# Patient Record
Sex: MALE | Race: WHITE | NOT HISPANIC OR LATINO | URBAN - METROPOLITAN AREA
[De-identification: names, ages, dates, MRNs, and addresses within clinical notes are randomized per-mention and may not be internally consistent; named-entity substitution may affect disease eponyms.]

---

## 2018-03-06 ENCOUNTER — INPATIENT (INPATIENT)
Facility: HOSPITAL | Age: 48
LOS: 1 days | Discharge: HOME | End: 2018-03-08
Attending: HOSPITALIST

## 2018-03-06 VITALS
TEMPERATURE: 97 F | RESPIRATION RATE: 18 BRPM | HEART RATE: 82 BPM | OXYGEN SATURATION: 97 % | DIASTOLIC BLOOD PRESSURE: 88 MMHG | SYSTOLIC BLOOD PRESSURE: 145 MMHG

## 2018-03-06 DIAGNOSIS — I20.0 UNSTABLE ANGINA: ICD-10-CM

## 2018-03-06 DIAGNOSIS — I25.810 ATHEROSCLEROSIS OF CORONARY ARTERY BYPASS GRAFT(S) WITHOUT ANGINA PECTORIS: ICD-10-CM

## 2018-03-06 DIAGNOSIS — Z98.890 OTHER SPECIFIED POSTPROCEDURAL STATES: Chronic | ICD-10-CM

## 2018-03-06 DIAGNOSIS — Z95.1 PRESENCE OF AORTOCORONARY BYPASS GRAFT: Chronic | ICD-10-CM

## 2018-03-06 LAB
ALBUMIN SERPL ELPH-MCNC: 4.6 G/DL — SIGNIFICANT CHANGE UP (ref 3–5.5)
ALP SERPL-CCNC: 47 U/L — SIGNIFICANT CHANGE UP (ref 30–115)
ALT FLD-CCNC: 34 U/L — SIGNIFICANT CHANGE UP (ref 0–41)
ANION GAP SERPL CALC-SCNC: 10 MMOL/L — SIGNIFICANT CHANGE UP (ref 7–14)
APTT BLD: 27.6 SEC — SIGNIFICANT CHANGE UP (ref 27–39.2)
AST SERPL-CCNC: 26 U/L — SIGNIFICANT CHANGE UP (ref 0–41)
BASOPHILS # BLD AUTO: 0.04 K/UL — SIGNIFICANT CHANGE UP (ref 0–0.2)
BASOPHILS NFR BLD AUTO: 0.6 % — SIGNIFICANT CHANGE UP (ref 0–1)
BILIRUB SERPL-MCNC: 0.8 MG/DL — SIGNIFICANT CHANGE UP (ref 0.2–1.2)
BUN SERPL-MCNC: 13 MG/DL — SIGNIFICANT CHANGE UP (ref 10–20)
CALCIUM SERPL-MCNC: 9.9 MG/DL — SIGNIFICANT CHANGE UP (ref 8.5–10.1)
CHLORIDE SERPL-SCNC: 104 MMOL/L — SIGNIFICANT CHANGE UP (ref 98–110)
CK MB BLD-MCNC: 2 % — SIGNIFICANT CHANGE UP (ref 0–4)
CK MB CFR SERPL CALC: 1.4 NG/ML — SIGNIFICANT CHANGE UP (ref 0.6–6.3)
CK MB CFR SERPL CALC: 3.2 NG/ML — SIGNIFICANT CHANGE UP (ref 0.6–6.3)
CK SERPL-CCNC: 150 U/L — SIGNIFICANT CHANGE UP (ref 0–225)
CK SERPL-CCNC: 168 U/L — SIGNIFICANT CHANGE UP (ref 0–225)
CO2 SERPL-SCNC: 24 MMOL/L — SIGNIFICANT CHANGE UP (ref 17–32)
CREAT SERPL-MCNC: 1 MG/DL — SIGNIFICANT CHANGE UP (ref 0.7–1.5)
EOSINOPHIL # BLD AUTO: 0.15 K/UL — SIGNIFICANT CHANGE UP (ref 0–0.7)
EOSINOPHIL NFR BLD AUTO: 2.2 % — SIGNIFICANT CHANGE UP (ref 0–8)
GLUCOSE SERPL-MCNC: 92 MG/DL — SIGNIFICANT CHANGE UP (ref 70–110)
HCT VFR BLD CALC: 41.8 % — LOW (ref 42–52)
HGB BLD-MCNC: 14.6 G/DL — SIGNIFICANT CHANGE UP (ref 14–18)
IMM GRANULOCYTES NFR BLD AUTO: 0.4 % — HIGH (ref 0.1–0.3)
LYMPHOCYTES # BLD AUTO: 1.8 K/UL — SIGNIFICANT CHANGE UP (ref 1.2–3.4)
LYMPHOCYTES # BLD AUTO: 26.9 % — SIGNIFICANT CHANGE UP (ref 20.5–51.1)
MCHC RBC-ENTMCNC: 30.8 PG — SIGNIFICANT CHANGE UP (ref 27–31)
MCHC RBC-ENTMCNC: 34.9 G/DL — SIGNIFICANT CHANGE UP (ref 32–37)
MCV RBC AUTO: 88.2 FL — SIGNIFICANT CHANGE UP (ref 80–94)
MONOCYTES # BLD AUTO: 0.67 K/UL — HIGH (ref 0.1–0.6)
MONOCYTES NFR BLD AUTO: 10 % — HIGH (ref 1.7–9.3)
NEUTROPHILS # BLD AUTO: 3.99 K/UL — SIGNIFICANT CHANGE UP (ref 1.4–6.5)
NEUTROPHILS NFR BLD AUTO: 59.9 % — SIGNIFICANT CHANGE UP (ref 42.2–75.2)
NRBC # BLD: 0 /100 WBCS — SIGNIFICANT CHANGE UP (ref 0–0)
PLATELET # BLD AUTO: 255 K/UL — SIGNIFICANT CHANGE UP (ref 130–400)
POTASSIUM SERPL-MCNC: 3.9 MMOL/L — SIGNIFICANT CHANGE UP (ref 3.5–5)
POTASSIUM SERPL-SCNC: 3.9 MMOL/L — SIGNIFICANT CHANGE UP (ref 3.5–5)
PROT SERPL-MCNC: 7 G/DL — SIGNIFICANT CHANGE UP (ref 6–8)
RBC # BLD: 4.74 M/UL — SIGNIFICANT CHANGE UP (ref 4.7–6.1)
RBC # FLD: 11.9 % — SIGNIFICANT CHANGE UP (ref 11.5–14.5)
SODIUM SERPL-SCNC: 138 MMOL/L — SIGNIFICANT CHANGE UP (ref 135–146)
TROPONIN I SERPL-MCNC: 0.07 NG/ML — HIGH (ref 0–0.05)
TROPONIN I SERPL-MCNC: 0.23 NG/ML — HIGH (ref 0–0.05)
WBC # BLD: 6.68 K/UL — SIGNIFICANT CHANGE UP (ref 4.8–10.8)
WBC # FLD AUTO: 6.68 K/UL — SIGNIFICANT CHANGE UP (ref 4.8–10.8)

## 2018-03-06 RX ORDER — FENOFIBRATE,MICRONIZED 130 MG
0 CAPSULE ORAL
Qty: 0 | Refills: 0 | COMMUNITY

## 2018-03-06 RX ORDER — ESCITALOPRAM OXALATE 10 MG/1
10 TABLET, FILM COATED ORAL DAILY
Qty: 0 | Refills: 0 | Status: DISCONTINUED | OUTPATIENT
Start: 2018-03-06 | End: 2018-03-08

## 2018-03-06 RX ORDER — ENOXAPARIN SODIUM 100 MG/ML
80 INJECTION SUBCUTANEOUS
Qty: 0 | Refills: 0 | Status: DISCONTINUED | OUTPATIENT
Start: 2018-03-06 | End: 2018-03-06

## 2018-03-06 RX ORDER — NITROGLYCERIN 6.5 MG
0.3 CAPSULE, EXTENDED RELEASE ORAL
Qty: 0 | Refills: 0 | Status: DISCONTINUED | OUTPATIENT
Start: 2018-03-06 | End: 2018-03-06

## 2018-03-06 RX ORDER — ASPIRIN/CALCIUM CARB/MAGNESIUM 324 MG
0 TABLET ORAL
Qty: 0 | Refills: 0 | COMMUNITY

## 2018-03-06 RX ORDER — METOPROLOL TARTRATE 50 MG
0 TABLET ORAL
Qty: 0 | Refills: 0 | COMMUNITY

## 2018-03-06 RX ORDER — ASPIRIN/CALCIUM CARB/MAGNESIUM 324 MG
81 TABLET ORAL DAILY
Qty: 0 | Refills: 0 | Status: DISCONTINUED | OUTPATIENT
Start: 2018-03-06 | End: 2018-03-08

## 2018-03-06 RX ORDER — CLOPIDOGREL BISULFATE 75 MG/1
75 TABLET, FILM COATED ORAL DAILY
Qty: 0 | Refills: 0 | Status: DISCONTINUED | OUTPATIENT
Start: 2018-03-06 | End: 2018-03-07

## 2018-03-06 RX ORDER — METOPROLOL TARTRATE 50 MG
25 TABLET ORAL
Qty: 0 | Refills: 0 | Status: DISCONTINUED | OUTPATIENT
Start: 2018-03-06 | End: 2018-03-08

## 2018-03-06 RX ORDER — ATORVASTATIN CALCIUM 80 MG/1
80 TABLET, FILM COATED ORAL AT BEDTIME
Qty: 0 | Refills: 0 | Status: DISCONTINUED | OUTPATIENT
Start: 2018-03-06 | End: 2018-03-08

## 2018-03-06 RX ORDER — ASPIRIN/CALCIUM CARB/MAGNESIUM 324 MG
81 TABLET ORAL ONCE
Qty: 0 | Refills: 0 | Status: COMPLETED | OUTPATIENT
Start: 2018-03-06 | End: 2018-03-06

## 2018-03-06 RX ORDER — LISINOPRIL 2.5 MG/1
0 TABLET ORAL
Qty: 0 | Refills: 0 | COMMUNITY

## 2018-03-06 RX ORDER — SODIUM CHLORIDE 9 MG/ML
3 INJECTION INTRAMUSCULAR; INTRAVENOUS; SUBCUTANEOUS ONCE
Qty: 0 | Refills: 0 | Status: COMPLETED | OUTPATIENT
Start: 2018-03-06 | End: 2018-03-06

## 2018-03-06 RX ORDER — CLOPIDOGREL BISULFATE 75 MG/1
300 TABLET, FILM COATED ORAL ONCE
Qty: 0 | Refills: 0 | Status: COMPLETED | OUTPATIENT
Start: 2018-03-06 | End: 2018-03-06

## 2018-03-06 RX ORDER — ROSUVASTATIN CALCIUM 5 MG/1
0 TABLET ORAL
Qty: 0 | Refills: 0 | COMMUNITY

## 2018-03-06 RX ORDER — NITROGLYCERIN 6.5 MG
0.4 CAPSULE, EXTENDED RELEASE ORAL
Qty: 0 | Refills: 0 | Status: DISCONTINUED | OUTPATIENT
Start: 2018-03-06 | End: 2018-03-08

## 2018-03-06 RX ORDER — LISINOPRIL 2.5 MG/1
5 TABLET ORAL DAILY
Qty: 0 | Refills: 0 | Status: DISCONTINUED | OUTPATIENT
Start: 2018-03-06 | End: 2018-03-08

## 2018-03-06 RX ORDER — ENOXAPARIN SODIUM 100 MG/ML
100 INJECTION SUBCUTANEOUS
Qty: 0 | Refills: 0 | Status: DISCONTINUED | OUTPATIENT
Start: 2018-03-06 | End: 2018-03-07

## 2018-03-06 RX ORDER — ESCITALOPRAM OXALATE 10 MG/1
0 TABLET, FILM COATED ORAL
Qty: 0 | Refills: 0 | COMMUNITY

## 2018-03-06 RX ORDER — PANTOPRAZOLE SODIUM 20 MG/1
40 TABLET, DELAYED RELEASE ORAL
Qty: 0 | Refills: 0 | Status: DISCONTINUED | OUTPATIENT
Start: 2018-03-06 | End: 2018-03-08

## 2018-03-06 RX ADMIN — Medication 25 MILLIGRAM(S): at 19:22

## 2018-03-06 RX ADMIN — ATORVASTATIN CALCIUM 80 MILLIGRAM(S): 80 TABLET, FILM COATED ORAL at 21:13

## 2018-03-06 RX ADMIN — CLOPIDOGREL BISULFATE 75 MILLIGRAM(S): 75 TABLET, FILM COATED ORAL at 19:22

## 2018-03-06 RX ADMIN — Medication 81 MILLIGRAM(S): at 13:10

## 2018-03-06 RX ADMIN — ENOXAPARIN SODIUM 100 MILLIGRAM(S): 100 INJECTION SUBCUTANEOUS at 21:14

## 2018-03-06 RX ADMIN — SODIUM CHLORIDE 3 MILLILITER(S): 9 INJECTION INTRAMUSCULAR; INTRAVENOUS; SUBCUTANEOUS at 13:10

## 2018-03-06 RX ADMIN — PANTOPRAZOLE SODIUM 40 MILLIGRAM(S): 20 TABLET, DELAYED RELEASE ORAL at 21:14

## 2018-03-06 NOTE — ED PROVIDER NOTE - CARE PLAN
Assessment and plan of treatment:	EKG with new T wave inversions III and aVF and 0.5 mm ST depressions V2-V4.  Imp: R/O ACS. No sign of PE or aortic dissection.  A/P: Received 162 mg ASA prior to arrival. Will give additional 162 mg. Check CXR, labs, enzymes. Will admit tele. Principal Discharge DX:	Chest pain  Assessment and plan of treatment:	EKG with new T wave inversions III and aVF and 0.5 mm ST depressions V2-V4.  Imp: R/O ACS. No sign of PE or aortic dissection.  A/P: Received 162 mg ASA prior to arrival. Will give additional 162 mg. Check CXR, labs, enzymes. Will admit tele.

## 2018-03-06 NOTE — SBIRT NOTE. - NSSBIRTSERVICES_GEN_A_ED_FT
Provided SBIRT services: Full Screen Negative  Positive reinforcement provided given patient currently within healthy guidelines.   AUDIT Score: 6  DAST-10 Score: 0  Duration = # 5 Minutes

## 2018-03-06 NOTE — ED PROVIDER NOTE - OBJECTIVE STATEMENT
46 y/o male with hx of CAD S/P CABG 3 years ago. c/o tightness to mid chest since approximately 10 AM. Onset at rest. Feels it a little in his back. No SOB/dizziness/diaphoresis/N/V. No tearing sensation. No recent travel/immobilization/surgery. No calf pain. Feels similar to previous CAD. Has been under a lot of stress recently.

## 2018-03-06 NOTE — CONSULT NOTE ADULT - SUBJECTIVE AND OBJECTIVE BOX
Patient is a 47y old  Male who presents with a chief complaint of chest pain, chest heaviness, not feeling well    HPI:  patient with hx of MI, Cath and CABG 3 years ago in [facility], presented for the first time chest pain, precordial, retrosternal, mid sternum, pressure type, kind of persistent, not reproducible by motion, breathing or palpation, happened at work while was doing his work as , admits not feeling well. Never had this symptom since Bypass.    PAST MEDICAL & SURGICAL HISTORY:  High cholesterol  Hypertension, unspecified type  History of quadruple bypass  History of appendectomy      PREVIOUS DIAGNOSTIC TESTING:      ECHO  FINDINGS:    STRESS TEST  FINDINGS:    CATHETERIZATION  FINDINGS:    MEDICATIONS  (STANDING):  aspirin  chewable 81 milliGRAM(s) Oral daily    MEDICATIONS  (PRN):      FAMILY HISTORY:  CAD    SOCIAL HISTORY:  CIGARETTES: ex smoker, quit 20 years ago  ALCOHOL: socially  DRUGS: no                      REVIEW OF SYSTEMS:  CONSTITUTIONAL: No distress, Looks stable  NECK: No pain, not stiff  RESPIRATORY: No cough, wheezing, mild shortness of breath  CARDIOVASCULAR: hest pain, SOB, no palpitations, leg swelling  GASTROINTESTINAL: No abdominal or epigastric pain. No nausea, vomiting, or hematemesis;  No melena, but feeling gas in the abdomen and chest.  NEUROLOGICAL: No dizziness, headaches, memory loss, loss of strength  MUSCULOSKELETAL: No joint pain, No  swelling; No muscle pain  PSYCHIATRIC: severe anxiety, major family matters      Vital Signs Last 24 Hrs  T(C): 35.9 (06 Mar 2018 11:39), Max: 35.9 (06 Mar 2018 11:39)  T(F): 96.7 (06 Mar 2018 11:39), Max: 96.7 (06 Mar 2018 11:39)  HR: 82 (06 Mar 2018 11:39) (82 - 82)  BP: 145/88 (06 Mar 2018 11:39) (145/88 - 145/88)  BP(mean): --  RR: 18 (06 Mar 2018 11:39) (18 - 18)  SpO2: 97% (06 Mar 2018 11:39) (97% - 97%)                      PHYSICAL EXAM:  GENERAL: No distress, well developed, sitting at the bed edge, but does not feel well  HEAD:  Atraumatic, Normocephalic  NECK: Supple, No JVD, No Bruit of either carotid arteries  NERVOUS SYSTEM:  Alert, Awake, Oriented to time, place, person; Normal memory and speech; Normal motor Strength 5/5 B/L upper and lower extremities  CHEST/LUNG: Normal air entry to lung base bilaterally; No wheeze, crackle, rales, rhonchi  HEART: Regular heart beat, S1, A2, P2, No S3, No S4, No gallop, No murmur  ABDOMEN: Soft, Non tender, Non distended; Bowel sounds present  EXTREMITIES:  2+ Peripheral Pulses, No clubbing, No edema  SKIN: No rashes or lesions    TELEMETRY: NSR, no arrhythmia    ECG: NSR, normal axis, q and T inversion in III, aVF and ST sagging in V3-v6, suggestive of old inferior MI and possible anterolateral ischemic changes    I&O's Detail      LABS:                        14.6   6.68  )-----------( 255      ( 06 Mar 2018 12:56 )             41.8     03-06    138  |  104  |  13  ----------------------------<  92  3.9   |  24  |  1.0    Ca    9.9      06 Mar 2018 12:56    TPro  7.0  /  Alb  4.6  /  TBili  0.8  /  DBili  x   /  AST  26  /  ALT  34  /  AlkPhos  47  03-06    CARDIAC MARKERS ( 06 Mar 2018 12:56 )  0.07 ng/mL / x     / 150 U/L / x     / 1.4 ng/mL      PTT - ( 06 Mar 2018 12:56 )  PTT:27.6 sec    I&O's Summary      RADIOLOGY & ADDITIONAL STUDIES:

## 2018-03-06 NOTE — H&P ADULT - ATTENDING COMMENTS
Patient seen and examined independently. I agree with the resident's note, physical exam, and plan except as below.  #NSTEMI - planned for cath today - continue current medical therapy - cont IV heparin til cath  #hx of CAD s/p CABG 2015  #check echo - pt states he is under stress  Admit to tele vs CCU post cath - follow up with cardio recs    pt clinically stable - still feels chest pressure, no sob, ambulating well

## 2018-03-06 NOTE — ED PROVIDER NOTE - PHYSICAL EXAMINATION
Non-toxic in appearance. Mildly anxious. No pallor, no jaundice. Neck supple, no meningeal signs. Lungs CTA and equal b/l. S1 S2 regular, no murmur. Equal pulses in all extremities. ABD soft, no tenderness. No pedal edema, no calf tenderness. No skin rash. No neurologic deficits.

## 2018-03-06 NOTE — H&P ADULT - NSHPREVIEWOFSYSTEMS_GEN_ALL_CORE
Negative for headache, dizziness, fever, cough, dyspnea, orthopnea, PNDs, nausea, vomiting, abdominal pain, diarrhea, dysuria, lower extremities swelling.
Head atraumatic, normal cephalic shape.

## 2018-03-06 NOTE — ED PROVIDER NOTE - PLAN OF CARE
EKG with new T wave inversions III and aVF and 0.5 mm ST depressions V2-V4.  Imp: R/O ACS. No sign of PE or aortic dissection.  A/P: Received 162 mg ASA prior to arrival. Will give additional 162 mg. Check CXR, labs, enzymes. Will admit tele.

## 2018-03-06 NOTE — ED PROVIDER NOTE - MEDICAL DECISION MAKING DETAILS
CXR with no acute abnormality. Tn negative. Received ASA. No sign fo PE or aortic dissection. Will admit tele.

## 2018-03-06 NOTE — H&P ADULT - NSHPLABSRESULTS_GEN_ALL_CORE
14.6   6.68  )-----------( 255      ( 06 Mar 2018 12:56 )             41.8     138  |  104  |  13  ----------------------------<  92  3.9   |  24  |  1.0    Ca    9.9      06 Mar 2018 12:56    TPro  7.0  /  Alb  4.6  /  TBili  0.8  /  DBili  x   /  AST  26  /  ALT  34  /  AlkPhos  47  03-06        PTT - ( 06 Mar 2018 12:56 )  PTT:27.6 sec    CARDIAC MARKERS ( 06 Mar 2018 12:56 )  0.07 ng/mL / x     / 150 U/L / x     / 1.4 ng/mL    ECG: Sinus rhythm at 75 bpm, inferolateral Twi, inferior Q

## 2018-03-06 NOTE — CONSULT NOTE ADULT - PROBLEM SELECTOR RECOMMENDATION 2
as mentioned  at this point is getting assessment for unstable angina in a petient with 3 years hx of CABG and severe anxiety disorder

## 2018-03-06 NOTE — H&P ADULT - NSHPPHYSICALEXAM_GEN_ALL_CORE
PHYSICAL EXAM:    T(F): 96.7, Max: 96.7 (03-06-18 @ 11:39)  HR: 82  BP: 145/88  RR: 18  SpO2: 97%    GENERAL: NAD, well-developed  HEAD:  Atraumatic, Normocephalic  EYES: EOMI, PERRLA, conjunctiva and sclera clear  NECK: Supple, No JVD  CHEST/LUNG: Clear to auscultation bilaterally; No wheeze  HEART: Regular rate and rhythm; No murmurs, rubs, or gallops  ABDOMEN: Soft, Nontender, Nondistended; Bowel sounds present  EXTREMITIES:  2+ Peripheral Pulses, No clubbing, cyanosis, or edema  PSYCH: AAOx3  NEUROLOGY: non-focal  SKIN: No rashes or lesions

## 2018-03-06 NOTE — ED ADULT NURSE NOTE - CAS EDN DISCHARGE ASSESSMENT
Patient baseline mental status Alert and oriented to person, place and time/Awake/Patient baseline mental status

## 2018-03-06 NOTE — H&P ADULT - ASSESSMENT
46 yo male patient with PMHx of CAD s/p CABG in 2015 presenting because of acute onset retrosternal chest pain at rest, on the day of presentation.  ECG: Twi inferolateral ; Trops first set 0.07  Pain description is consistent with cardiac etiology; In fact vein grafts can fail after 3 years from surgery    # Working diagnosis: Unstable angina / Non STEMI  Will admit to telemetry  Treat as ACS: Asa, plavix, statin, betablockers, ace inh, anticoag  follow up second set of trops, repeat ECG in AM or if any chest pain  Can give Nitro for pain prn  Consult cardiology for possible intervention (Dr. Ruiz)  2d echo to assess LV function    # DVT proph: patient will be on therapeutic anticoag  # GI proph: indicated in a patient on asa plavix and therapeutic anticoag  # Diet: DASH  # Activity: As tolerated  # Full code  # Dispo: home

## 2018-03-06 NOTE — H&P ADULT - FAMILY HISTORY
Father  Still living? Yes, Estimated age: Age Unknown  Family history of coronary artery disease in brother, Age at diagnosis: Age Unknown     Sibling  Still living? Unknown  Family history of coronary artery disease in brother, Age at diagnosis: Age Unknown

## 2018-03-06 NOTE — H&P ADULT - HISTORY OF PRESENT ILLNESS
48 yo male patient with PMHx of CAD s/p CABG x4 vessels in 2015, presented because of the above chief complaint.  History goes back to the day of presentation, when patient started experiencing retrosternal oppressive chest pain, at rest, radiating to both shoulders and back, 5/10 in intensity, no SOB, no palpitations, no sweating, no nausea or vomiting. Pain lasted for like an hour, he took 2 aspirins, and then pain improved. currently it is waxing and waning, comes for few minutes and then goes away. It is not related to exertion or position.  Patient says that since the open heart surgery, he did not have any chest pain, was able to ambulate and climb the stairs with no pain.  This is his first episode since his surgery.  No other issues

## 2018-03-06 NOTE — CONSULT NOTE ADULT - PROBLEM SELECTOR RECOMMENDATION 9
chest discomfort, chest heaviness, suggestive of UA,   second set of Troponin  start IV heparin drip keep PTT at 55-60  if negative Troponin and no more chest discomfort do treadmill stress nuclear test, but if either abnormal Troponin or persistent chest pain will do cardiac cath tomorrow  aspirin 325 mg daily  continue with the rest of the meds

## 2018-03-07 LAB
ANION GAP SERPL CALC-SCNC: 4 MMOL/L — LOW (ref 7–14)
BUN SERPL-MCNC: 13 MG/DL — SIGNIFICANT CHANGE UP (ref 10–20)
CALCIUM SERPL-MCNC: 9.3 MG/DL — SIGNIFICANT CHANGE UP (ref 8.5–10.1)
CHLORIDE SERPL-SCNC: 109 MMOL/L — SIGNIFICANT CHANGE UP (ref 98–110)
CHOLEST SERPL-MCNC: 163 MG/DL — SIGNIFICANT CHANGE UP (ref 100–200)
CK MB BLD-MCNC: 4 % — SIGNIFICANT CHANGE UP (ref 0–4)
CK MB CFR SERPL CALC: 10.8 NG/ML — HIGH (ref 0.6–6.3)
CK SERPL-CCNC: 269 U/L — HIGH (ref 0–225)
CO2 SERPL-SCNC: 25 MMOL/L — SIGNIFICANT CHANGE UP (ref 17–32)
CREAT SERPL-MCNC: 1.2 MG/DL — SIGNIFICANT CHANGE UP (ref 0.7–1.5)
ESTIMATED AVERAGE GLUCOSE: 111 MG/DL — SIGNIFICANT CHANGE UP (ref 68–114)
GLUCOSE SERPL-MCNC: 99 MG/DL — SIGNIFICANT CHANGE UP (ref 70–110)
HBA1C BLD-MCNC: 5.5 % — SIGNIFICANT CHANGE UP (ref 4–5.6)
HCT VFR BLD CALC: 42.1 % — SIGNIFICANT CHANGE UP (ref 42–52)
HDLC SERPL-MCNC: 47 MG/DL — SIGNIFICANT CHANGE UP (ref 40–60)
HGB BLD-MCNC: 14.9 G/DL — SIGNIFICANT CHANGE UP (ref 14–18)
LIPID PNL WITH DIRECT LDL SERPL: 97 MG/DL — SIGNIFICANT CHANGE UP (ref 50–100)
MCHC RBC-ENTMCNC: 31.1 PG — HIGH (ref 27–31)
MCHC RBC-ENTMCNC: 35.4 G/DL — SIGNIFICANT CHANGE UP (ref 32–37)
MCV RBC AUTO: 87.9 FL — SIGNIFICANT CHANGE UP (ref 80–94)
NRBC # BLD: 0 /100 WBCS — SIGNIFICANT CHANGE UP (ref 0–0)
PLATELET # BLD AUTO: 241 K/UL — SIGNIFICANT CHANGE UP (ref 130–400)
POTASSIUM SERPL-MCNC: 4 MMOL/L — SIGNIFICANT CHANGE UP (ref 3.5–5)
POTASSIUM SERPL-SCNC: 4 MMOL/L — SIGNIFICANT CHANGE UP (ref 3.5–5)
RBC # BLD: 4.79 M/UL — SIGNIFICANT CHANGE UP (ref 4.7–6.1)
RBC # FLD: 11.8 % — SIGNIFICANT CHANGE UP (ref 11.5–14.5)
SODIUM SERPL-SCNC: 138 MMOL/L — SIGNIFICANT CHANGE UP (ref 135–146)
TOTAL CHOLESTEROL/HDL RATIO MEASUREMENT: 3.5 RATIO — LOW (ref 4–5.5)
TRIGL SERPL-MCNC: 131 MG/DL — SIGNIFICANT CHANGE UP (ref 40–150)
TROPONIN I SERPL-MCNC: 1.88 NG/ML — CRITICAL HIGH (ref 0–0.05)
WBC # BLD: 6.84 K/UL — SIGNIFICANT CHANGE UP (ref 4.8–10.8)
WBC # FLD AUTO: 6.84 K/UL — SIGNIFICANT CHANGE UP (ref 4.8–10.8)

## 2018-03-07 RX ORDER — HEPARIN SODIUM 5000 [USP'U]/ML
INJECTION INTRAVENOUS; SUBCUTANEOUS
Qty: 25000 | Refills: 0 | Status: DISCONTINUED | OUTPATIENT
Start: 2018-03-07 | End: 2018-03-07

## 2018-03-07 RX ORDER — ACETAMINOPHEN 500 MG
650 TABLET ORAL EVERY 6 HOURS
Qty: 0 | Refills: 0 | Status: DISCONTINUED | OUTPATIENT
Start: 2018-03-07 | End: 2018-03-08

## 2018-03-07 RX ORDER — SENNA PLUS 8.6 MG/1
2 TABLET ORAL AT BEDTIME
Qty: 0 | Refills: 0 | Status: DISCONTINUED | OUTPATIENT
Start: 2018-03-07 | End: 2018-03-08

## 2018-03-07 RX ORDER — SODIUM CHLORIDE 9 MG/ML
1000 INJECTION INTRAMUSCULAR; INTRAVENOUS; SUBCUTANEOUS
Qty: 0 | Refills: 0 | Status: DISCONTINUED | OUTPATIENT
Start: 2018-03-07 | End: 2018-03-08

## 2018-03-07 RX ORDER — HEPARIN SODIUM 5000 [USP'U]/ML
5000 INJECTION INTRAVENOUS; SUBCUTANEOUS ONCE
Qty: 0 | Refills: 0 | Status: DISCONTINUED | OUTPATIENT
Start: 2018-03-07 | End: 2018-03-07

## 2018-03-07 RX ORDER — TICAGRELOR 90 MG/1
90 TABLET ORAL
Qty: 0 | Refills: 0 | Status: DISCONTINUED | OUTPATIENT
Start: 2018-03-07 | End: 2018-03-08

## 2018-03-07 RX ADMIN — ENOXAPARIN SODIUM 100 MILLIGRAM(S): 100 INJECTION SUBCUTANEOUS at 05:52

## 2018-03-07 RX ADMIN — Medication 81 MILLIGRAM(S): at 12:03

## 2018-03-07 RX ADMIN — HEPARIN SODIUM 1000 UNIT(S)/HR: 5000 INJECTION INTRAVENOUS; SUBCUTANEOUS at 09:50

## 2018-03-07 RX ADMIN — PANTOPRAZOLE SODIUM 40 MILLIGRAM(S): 20 TABLET, DELAYED RELEASE ORAL at 09:45

## 2018-03-07 RX ADMIN — Medication 25 MILLIGRAM(S): at 05:52

## 2018-03-07 RX ADMIN — SENNA PLUS 2 TABLET(S): 8.6 TABLET ORAL at 21:54

## 2018-03-07 RX ADMIN — CLOPIDOGREL BISULFATE 75 MILLIGRAM(S): 75 TABLET, FILM COATED ORAL at 12:03

## 2018-03-07 RX ADMIN — LISINOPRIL 5 MILLIGRAM(S): 2.5 TABLET ORAL at 05:52

## 2018-03-07 RX ADMIN — ATORVASTATIN CALCIUM 80 MILLIGRAM(S): 80 TABLET, FILM COATED ORAL at 21:54

## 2018-03-07 RX ADMIN — ESCITALOPRAM OXALATE 10 MILLIGRAM(S): 10 TABLET, FILM COATED ORAL at 12:04

## 2018-03-07 NOTE — PROGRESS NOTE ADULT - ASSESSMENT
NSTEMI, typical chest pain  CAD, Hx of CABG  already awaiting cardiac cath today    now at 9.30 am resident decided to give Heparin infusion, just few hours prior cardiac cath, do not give heparin bolous, just start with maintenance drip, send the patient to Cath lab on Heparin infusion.  continue with   Lipitor 80 mg daily  Post Cath should be admited to telemetry or CCU based on the Cath result and possible PCI outcome

## 2018-03-07 NOTE — PROGRESS NOTE ADULT - ASSESSMENT
48 yo male patient with PMHx of CAD s/p CABG in 2015 presenting because of acute onset retrosternal chest pain at rest, on the day of presentation.    # Working diagnosis: Non STEMI  Treat as ACS: Asa, plavix, statin, betablockers, ace- i, anticoag  2d echo to assess LV function: pending   rising troponin 0.07-->0.23--> 1.83,   s/p cardiac cath and stents in LIMA and LIMA anastomosis  check CBC and cardiac enzymes in am  -watch for hematoma    # DVT proph:     # GI proph: cw protonics    # Diet: DASH  # Activity: As tolerated  # Full code  # Dispo: home

## 2018-03-07 NOTE — PROGRESS NOTE ADULT - SUBJECTIVE AND OBJECTIVE BOX
LENGTH OF HOSPITAL STAY: 1d  received pt from cardiac cath     CHIEF COMPLAINT:   Patient is a 47y old  Male who presents with a chief complaint of Chest pain on the day of presentation (06 Mar 2018 17:08)      HISTORY OF PRESENTING ILLNESS:    HPI:  46 yo male patient with PMHx of CAD s/p CABG x4 vessels in 2015, presented because of the above chief complaint.  History goes back to the day of presentation, when patient started experiencing retrosternal oppressive chest pain, at rest, radiating to both shoulders and back, 5/10 in intensity, no SOB, no palpitations, no sweating, no nausea or vomiting. Pain lasted for like an hour, he took 2 aspirins, and then pain improved. currently it is waxing and waning, comes for few minutes and then goes away. It is not related to exertion or position.  Patient says that since the open heart surgery, he did not have any chest pain, was able to ambulate and climb the stairs with no pain.  This is his first episode since his surgery.  No other issues (06 Mar 2018 17:08)    PAST MEDICAL & SURGICAL HISTORY  PAST MEDICAL & SURGICAL HISTORY:  Coronary artery disease  High cholesterol  Hypertension, unspecified type  History of quadruple bypass  History of appendectomy    SOCIAL HISTORY:  ex smoker quit 20 yr back  occasional alcohol  ALLERGIES:  No Known Allergies    Home Medications:  aspirin: 81 milligram(s) orally once a day (06 Mar 2018 17:16)  Crestor: 10 milligram(s) orally once a day (at bedtime) (06 Mar 2018 17:19)  fenofibrate: 145 milligram(s) orally once a day (06 Mar 2018 17:19)  Lexapro: 10 milligram(s) orally once a day (06 Mar 2018 17:19)  lisinopril: 2.5 milligram(s) orally once a day (06 Mar 2018 17:19)  metoprolol: 25 milligram(s) orally 2 times a day (06 Mar 2018 17:20)    MEDICATIONS:  STANDING MEDICATIONS  aspirin  chewable 81 milliGRAM(s) Oral daily  atorvastatin 80 milliGRAM(s) Oral at bedtime  escitalopram 10 milliGRAM(s) Oral daily  lisinopril 5 milliGRAM(s) Oral daily  metoprolol     tartrate 25 milliGRAM(s) Oral two times a day  pantoprazole    Tablet 40 milliGRAM(s) Oral before breakfast  senna 2 Tablet(s) Oral at bedtime  sodium chloride 0.9%. 1000 milliLiter(s) IV Continuous <Continuous>  ticagrelor 90 milliGRAM(s) Oral two times a day    PRN MEDICATIONS  acetaminophen   Tablet. 650 milliGRAM(s) Oral every 6 hours PRN  nitroglycerin     SubLingual 0.4 milliGRAM(s) SubLingual every 5 minutes PRN    VITALS:   ICU Vital Signs Last 24 Hrs  T(C): 37.2 (07 Mar 2018 20:00), Max: 37.2 (07 Mar 2018 20:00)  T(F): 98.9 (07 Mar 2018 20:00), Max: 98.9 (07 Mar 2018 20:00)  HR: 84 (07 Mar 2018 20:30) (69 - 84)  BP: 150/76 (07 Mar 2018 20:30) (106/62 - 150/76)  BP(mean): 93 (07 Mar 2018 20:30) (88 - 94)  ABP: --  ABP(mean): --  RR: 18 (07 Mar 2018 20:30) (16 - 19)  SpO2: 100% (07 Mar 2018 20:30) (100% - 100%)      LABS:                        14.9   6.84  )-----------( 241      ( 07 Mar 2018 07:03 )             42.1     03-07    138  |  109  |  13  ----------------------------<  99  4.0   |  25  |  1.2    Ca    9.3      07 Mar 2018 07:03    TPro  7.0  /  Alb  4.6  /  TBili  0.8  /  DBili  x   /  AST  26  /  ALT  34  /  AlkPhos  47  03-06    PTT - ( 06 Mar 2018 12:56 )  PTT:27.6 sec      Creatine Kinase, Serum: 269 U/L <H> (03-07-18 @ 07:03)  Troponin I, Serum: 1.88 ng/mL <HH> (03-07-18 @ 07:03)      CARDIAC MARKERS ( 07 Mar 2018 07:03 )  1.88 ng/mL / x     / 269 U/L / x     / 10.8 ng/mL  CARDIAC MARKERS ( 06 Mar 2018 16:32 )  0.23 ng/mL / x     / 168 U/L / x     / 3.2 ng/mL  CARDIAC MARKERS ( 06 Mar 2018 12:56 )  0.07 ng/mL / x     / 150 U/L / x     / 1.4 ng/mL    Lipid Profile in AM (03.07.18 @ 07:03)    Total Cholesterol/HDL Ratio Measurement: 3.5 Ratio    Cholesterol, Serum: 163 mg/dL    Triglycerides, Serum: 131 mg/dL    HDL Cholesterol, Serum: 47 mg/dL    Direct LDL: 97: LDL Cholesterol ---   Hemoglobin A1C, Whole Blood: 5.5 % (03.07.18 @ 07:03)        RADIOLOGY:  < from: Xray Chest 2 Views PA/Lat (03.06.18 @ 13:41) >      < end of copied text >    PHYSICAL EXAM:  GEN: No acute distress  HEENT: within normal range  LUNGS: Clear to auscultation bilaterally   HEART: S1/S2 present. RRR.   ABD: Soft, non-tender, non-distended. Bowel sounds present  EXT:right femoral site ,claen ,mild swelling noticed  NEURO: AAOX3

## 2018-03-07 NOTE — PROGRESS NOTE ADULT - SUBJECTIVE AND OBJECTIVE BOX
PRE-OPERATIVE DAY OF PROCEDURE EVALUATION DOCOMENTATION  I have personally seen and examined the patient.  I agree with the history and physical which I have reviewed and noted any changes below.  SIGNED ELECTRONICALLY BY AYAZ RUIZ MD 03-07-18 @ 19:29                                         POST OPERATIVE PROCEDURAL DOCUMENTATION  PRE-OP DIAGNOSIS:       PROCEDURE:   LEFT HEART CATHERIZATION    Physician:  Ayaz Ruiz MD  Assistant:  MD    ANESTHESIA TYPE:  [ X] Sedation  [ X] Local/Regional  [   ]General Anesthesia    ESTIMATED BLOOD LOSS:      less than 10 mL    CONDITION  [X ] Good  [   ] Fair  [   ] Serious  [   ] Critical      SPECIMENS REMOVED (IF APPLICABLE):   None        IMPLANTS (IF APPLICABLE) Stents to LIMA anastomsis and LIMA      FINDINGS:  LEFT HEART CATHERIZATION                                    LVEF55%:  LVEDP:    Left main P    LAD:  total fills via ESCOBEDO with 90% lesion at ansatomosis.                            Left Circumflex: Total OM 1 fills via free ian       Right Cornary Artery:  Total Tills vioa SVG        RIGHT HEART CATHERIZATION ( Not Performed)  PA:  PCW:  CO/CI:    PERCUTANEOUS CORONARY INTERVENTIONS:      COMPLICATIONS:  None      POST-OP DIAGNOSIS CAD    [ ] Normal Coronary Arteries  [ ] Luminal Irregularities  [ ] Non-obstructive CAD        PLAN OF CARE    [ ] D/C Home today   [ ]  D/C in AM  [ ] Return to In-patient bed  [x ] Admit for observation  [ ] Return for staged procedure:  [ ] CT Surgery consult called  [ ]  Continue DAPT, B-blocker & Statin therapy  [ ]  Medical Therapy  [ X] Aggressive risk factor modification. The patient should follow a low fat and low calorie diet.

## 2018-03-07 NOTE — PROGRESS NOTE ADULT - SUBJECTIVE AND OBJECTIVE BOX
Subjective:  patient has had chest discomfort, on and off but generally stable, abnormal ck mb and troponin, presentation was a typical chest pain, he has been on the schedule to have cardiac cath today. Unfortunately he did not receive IV Heparin over night.  no chest pain now, no SOB, no palpitation       MEDICATIONS  (STANDING):  aspirin  chewable 81 milliGRAM(s) Oral daily  atorvastatin 80 milliGRAM(s) Oral at bedtime  clopidogrel Tablet 75 milliGRAM(s) Oral daily  clopidogrel Tablet 300 milliGRAM(s) Oral once  enoxaparin Injectable 100 milliGRAM(s) SubCutaneous two times a day  escitalopram 10 milliGRAM(s) Oral daily  heparin  Infusion.  Unit(s)/Hr (10 mL/Hr) IV Continuous <Continuous>  heparin  Injectable 5000 Unit(s) IV Push once  lisinopril 5 milliGRAM(s) Oral daily  metoprolol     tartrate 25 milliGRAM(s) Oral two times a day  pantoprazole    Tablet 40 milliGRAM(s) Oral before breakfast    MEDICATIONS  (PRN):  nitroglycerin     SubLingual 0.4 milliGRAM(s) SubLingual every 5 minutes PRN Chest Pain            Vital Signs Last 24 Hrs  T(C): 35.8 (07 Mar 2018 05:45), Max: 36.7 (06 Mar 2018 19:32)  T(F): 96.5 (07 Mar 2018 05:45), Max: 98.1 (06 Mar 2018 19:32)  HR: 76 (07 Mar 2018 05:45) (69 - 82)  BP: 106/62 (07 Mar 2018 05:45) (106/62 - 145/88)  BP(mean): --  RR: 18 (07 Mar 2018 05:45) (16 - 18)  SpO2: 95% (06 Mar 2018 19:32) (95% - 97%)             REVIEW OF SYSTEMS:  CONSTITUTIONAL: no fever, no chills, no diaphoresis  CARDIOLOGY: chest pain, chest pressure, no SOB, no palpitation, no diaphoresis, no faint   RESPIRATORY: no dyspnea, no wheeze, no orthopnea, no PND   NEUROLOGICAL: no dizziness, headache, focal deficits to report.  GI: no abdominal pain, no dyspepsia, no nausea, no vomiting, no diarrhea.               PHYSICAL EXAM:  · CONSTITUTIONAL: Looks stable, in no diress  . NECK: Supple, no JVD, no bruit on either carotid side   · RESPIRATORY: Normal air entry to lung base, no wheeze, no crackle, no wet rales  · CARDIOVASCULAR: Normal S1, A2, P2, no murmur, no click, regular rate,  no rub,  · EXTREMITIES: No cyanosis, no clubbing, no edema  · VASCULAR: Pulses are regular, equal, bilateral in upper and lower extremities  	  TELEMETRY: NSR    ECG: < from: 12 Lead ECG (03.06.18 @ 15:45) >  Diagnosis Line Normal sinus rhythm  Cannot rule out Inferior infarct , age undetermined    < end of copied text >      TTE:    LABS:                        14.9   6.84  )-----------( 241      ( 07 Mar 2018 07:03 )             42.1     03-07    138  |  109  |  13  ----------------------------<  99  4.0   |  25  |  1.2    Ca    9.3      07 Mar 2018 07:03    TPro  7.0  /  Alb  4.6  /  TBili  0.8  /  DBili  x   /  AST  26  /  ALT  34  /  AlkPhos  47  03-06    CARDIAC MARKERS ( 07 Mar 2018 07:03 )  1.88 ng/mL / x     / 269 U/L / x     / 10.8 ng/mL  CARDIAC MARKERS ( 06 Mar 2018 16:32 )  0.23 ng/mL / x     / 168 U/L / x     / 3.2 ng/mL  CARDIAC MARKERS ( 06 Mar 2018 12:56 )  0.07 ng/mL / x     / 150 U/L / x     / 1.4 ng/mL      PTT - ( 06 Mar 2018 12:56 )  PTT:27.6 sec    I&O's Summary    BNP  RADIOLOGY & ADDITIONAL STUDIES: < from: Xray Chest 2 Views PA/Lat (03.06.18 @ 13:41) >  No radiographic evidence of acute cardiopulmonary disease.    < end of copied text >      IMPRESSION AND PLAN:

## 2018-03-07 NOTE — PROGRESS NOTE ADULT - SUBJECTIVE AND OBJECTIVE BOX
SUBJECTIVE:    Patient is a 47y old Male who presents with a chief complaint of Chest pain on the day of presentation (06 Mar 2018 17:08)    Currently admitted to medicine with the primary diagnosis of Chest pain     Today is hospital day 1d. This morning he is resting comfortably in bed and reports some chest discomfort similar to his pain when he had the CABG     PAST MEDICAL & SURGICAL HISTORY  Coronary artery disease  High cholesterol  Hypertension, unspecified type  History of quadruple bypass  History of appendectomy    SOCIAL HISTORY:  Negative for smoking/alcohol/drug use.     ALLERGIES:  No Known Allergies    MEDICATIONS:  STANDING MEDICATIONS  aspirin  chewable 81 milliGRAM(s) Oral daily  atorvastatin 80 milliGRAM(s) Oral at bedtime  clopidogrel Tablet 75 milliGRAM(s) Oral daily  clopidogrel Tablet 300 milliGRAM(s) Oral once  enoxaparin Injectable 100 milliGRAM(s) SubCutaneous two times a day  escitalopram 10 milliGRAM(s) Oral daily  lisinopril 5 milliGRAM(s) Oral daily  metoprolol     tartrate 25 milliGRAM(s) Oral two times a day  pantoprazole    Tablet 40 milliGRAM(s) Oral before breakfast    PRN MEDICATIONS  nitroglycerin     SubLingual 0.4 milliGRAM(s) SubLingual every 5 minutes PRN    VITALS:   T(F): 96.5  HR: 76  BP: 106/62  RR: 18  SpO2: 95%    LABS:                        14.9   6.84  )-----------( 241      ( 07 Mar 2018 07:03 )             42.1     03-07    138  |  109  |  13  ----------------------------<  99  4.0   |  25  |  1.2    Ca    9.3      07 Mar 2018 07:03    TPro  7.0  /  Alb  4.6  /  TBili  0.8  /  DBili  x   /  AST  26  /  ALT  34  /  AlkPhos  47  03-06    PTT - ( 06 Mar 2018 12:56 )  PTT:27.6 sec      Creatine Kinase, Serum: 269 U/L <H> (03-07-18 @ 07:03)  Troponin I, Serum: 1.88 ng/mL <HH> (03-07-18 @ 07:03)  Creatine Kinase, Serum: 168 U/L (03-06-18 @ 16:32)  Troponin I, Serum: 0.23 ng/mL <H> (03-06-18 @ 16:32)  Creatine Kinase, Serum: 150 U/L (03-06-18 @ 12:56)  Troponin I, Serum: 0.07 ng/mL <H> (03-06-18 @ 12:56)      CARDIAC MARKERS ( 07 Mar 2018 07:03 )  1.88 ng/mL / x     / 269 U/L / x     / 10.8 ng/mL  CARDIAC MARKERS ( 06 Mar 2018 16:32 )  0.23 ng/mL / x     / 168 U/L / x     / 3.2 ng/mL  CARDIAC MARKERS ( 06 Mar 2018 12:56 )  0.07 ng/mL / x     / 150 U/L / x     / 1.4 ng/mL      RADIOLOGY:    PHYSICAL EXAM:  GEN: No acute distress  LUNGS: Clear to auscultation bilaterally   HEART: S1/S2 present. RRR.   ABD: Soft, non-tender, non-distended. Bowel sounds present  EXT: NC/NC/NE/2+PP/BISHOP  NEURO: AAOX3

## 2018-03-07 NOTE — PROGRESS NOTE ADULT - ASSESSMENT
48 yo male patient with PMHx of CAD s/p CABG in 2015 presenting because of acute onset retrosternal chest pain at rest, on the day of presentation.    # Working diagnosis: Non STEMI  Treat as ACS: Asa, plavix, statin, betablockers, ace inh, anticoag  Can give Nitro for pain prn  2d echo to assess LV function: pending  Given rising troponin 0.07-->0.23--> 1.83, start heparin drip  d/w cardiac fellow, pt will most likely need cath  keep NPO    # DVT proph: heparin    # GI proph: indicated in a patient on asa plavix and therapeutic anticoag    # Diet: DASH  # Activity: As tolerated  # Full code  # Dispo: home

## 2018-03-08 VITALS
HEART RATE: 84 BPM | SYSTOLIC BLOOD PRESSURE: 121 MMHG | OXYGEN SATURATION: 98 % | DIASTOLIC BLOOD PRESSURE: 60 MMHG | RESPIRATION RATE: 22 BRPM

## 2018-03-08 LAB
ANION GAP SERPL CALC-SCNC: 6 MMOL/L — LOW (ref 7–14)
ANION GAP SERPL CALC-SCNC: 9 MMOL/L — SIGNIFICANT CHANGE UP (ref 7–14)
BASOPHILS # BLD AUTO: 0.03 K/UL — SIGNIFICANT CHANGE UP (ref 0–0.2)
BASOPHILS NFR BLD AUTO: 0.3 % — SIGNIFICANT CHANGE UP (ref 0–1)
BUN SERPL-MCNC: 15 MG/DL — SIGNIFICANT CHANGE UP (ref 10–20)
BUN SERPL-MCNC: 16 MG/DL — SIGNIFICANT CHANGE UP (ref 10–20)
CALCIUM SERPL-MCNC: 9.1 MG/DL — SIGNIFICANT CHANGE UP (ref 8.5–10.1)
CALCIUM SERPL-MCNC: 9.6 MG/DL — SIGNIFICANT CHANGE UP (ref 8.5–10.1)
CHLORIDE SERPL-SCNC: 105 MMOL/L — SIGNIFICANT CHANGE UP (ref 98–110)
CHLORIDE SERPL-SCNC: 110 MMOL/L — SIGNIFICANT CHANGE UP (ref 98–110)
CK MB BLD-MCNC: 3 % — SIGNIFICANT CHANGE UP (ref 0–4)
CK MB CFR SERPL CALC: 5.1 NG/ML — SIGNIFICANT CHANGE UP (ref 0.6–6.3)
CK SERPL-CCNC: 189 U/L — SIGNIFICANT CHANGE UP (ref 0–225)
CO2 SERPL-SCNC: 22 MMOL/L — SIGNIFICANT CHANGE UP (ref 17–32)
CO2 SERPL-SCNC: 24 MMOL/L — SIGNIFICANT CHANGE UP (ref 17–32)
CREAT SERPL-MCNC: 1.1 MG/DL — SIGNIFICANT CHANGE UP (ref 0.7–1.5)
CREAT SERPL-MCNC: 1.1 MG/DL — SIGNIFICANT CHANGE UP (ref 0.7–1.5)
EOSINOPHIL # BLD AUTO: 0.09 K/UL — SIGNIFICANT CHANGE UP (ref 0–0.7)
EOSINOPHIL NFR BLD AUTO: 0.9 % — SIGNIFICANT CHANGE UP (ref 0–8)
GLUCOSE SERPL-MCNC: 105 MG/DL — SIGNIFICANT CHANGE UP (ref 70–110)
GLUCOSE SERPL-MCNC: 106 MG/DL — SIGNIFICANT CHANGE UP (ref 70–110)
HCT VFR BLD CALC: 40.3 % — LOW (ref 42–52)
HCT VFR BLD CALC: 40.9 % — LOW (ref 42–52)
HGB BLD-MCNC: 14.1 G/DL — SIGNIFICANT CHANGE UP (ref 14–18)
HGB BLD-MCNC: 14.3 G/DL — SIGNIFICANT CHANGE UP (ref 14–18)
IMM GRANULOCYTES NFR BLD AUTO: 0.5 % — HIGH (ref 0.1–0.3)
LYMPHOCYTES # BLD AUTO: 1.71 K/UL — SIGNIFICANT CHANGE UP (ref 1.2–3.4)
LYMPHOCYTES # BLD AUTO: 17.1 % — LOW (ref 20.5–51.1)
MAGNESIUM SERPL-MCNC: 2.1 MG/DL — SIGNIFICANT CHANGE UP (ref 1.8–2.4)
MCHC RBC-ENTMCNC: 31 PG — SIGNIFICANT CHANGE UP (ref 27–31)
MCHC RBC-ENTMCNC: 31.1 PG — HIGH (ref 27–31)
MCHC RBC-ENTMCNC: 35 G/DL — SIGNIFICANT CHANGE UP (ref 32–37)
MCHC RBC-ENTMCNC: 35 G/DL — SIGNIFICANT CHANGE UP (ref 32–37)
MCV RBC AUTO: 88.5 FL — SIGNIFICANT CHANGE UP (ref 80–94)
MCV RBC AUTO: 88.8 FL — SIGNIFICANT CHANGE UP (ref 80–94)
MONOCYTES # BLD AUTO: 0.92 K/UL — HIGH (ref 0.1–0.6)
MONOCYTES NFR BLD AUTO: 9.2 % — SIGNIFICANT CHANGE UP (ref 1.7–9.3)
NEUTROPHILS # BLD AUTO: 7.2 K/UL — HIGH (ref 1.4–6.5)
NEUTROPHILS NFR BLD AUTO: 72 % — SIGNIFICANT CHANGE UP (ref 42.2–75.2)
NRBC # BLD: 0 /100 WBCS — SIGNIFICANT CHANGE UP (ref 0–0)
PLATELET # BLD AUTO: 241 K/UL — SIGNIFICANT CHANGE UP (ref 130–400)
PLATELET # BLD AUTO: 247 K/UL — SIGNIFICANT CHANGE UP (ref 130–400)
POTASSIUM SERPL-MCNC: 3.6 MMOL/L — SIGNIFICANT CHANGE UP (ref 3.5–5)
POTASSIUM SERPL-MCNC: 3.9 MMOL/L — SIGNIFICANT CHANGE UP (ref 3.5–5)
POTASSIUM SERPL-SCNC: 3.6 MMOL/L — SIGNIFICANT CHANGE UP (ref 3.5–5)
POTASSIUM SERPL-SCNC: 3.9 MMOL/L — SIGNIFICANT CHANGE UP (ref 3.5–5)
RBC # BLD: 4.54 M/UL — LOW (ref 4.7–6.1)
RBC # BLD: 4.62 M/UL — LOW (ref 4.7–6.1)
RBC # FLD: 11.9 % — SIGNIFICANT CHANGE UP (ref 11.5–14.5)
RBC # FLD: 12 % — SIGNIFICANT CHANGE UP (ref 11.5–14.5)
SODIUM SERPL-SCNC: 136 MMOL/L — SIGNIFICANT CHANGE UP (ref 135–146)
SODIUM SERPL-SCNC: 140 MMOL/L — SIGNIFICANT CHANGE UP (ref 135–146)
TROPONIN I SERPL-MCNC: 1.72 NG/ML — CRITICAL HIGH (ref 0–0.05)
WBC # BLD: 10 K/UL — SIGNIFICANT CHANGE UP (ref 4.8–10.8)
WBC # BLD: 8.5 K/UL — SIGNIFICANT CHANGE UP (ref 4.8–10.8)
WBC # FLD AUTO: 10 K/UL — SIGNIFICANT CHANGE UP (ref 4.8–10.8)
WBC # FLD AUTO: 8.5 K/UL — SIGNIFICANT CHANGE UP (ref 4.8–10.8)

## 2018-03-08 RX ORDER — PRASUGREL 5 MG/1
60 TABLET, FILM COATED ORAL ONCE
Qty: 0 | Refills: 0 | Status: COMPLETED | OUTPATIENT
Start: 2018-03-08 | End: 2018-03-08

## 2018-03-08 RX ORDER — LISINOPRIL 2.5 MG/1
2.5 TABLET ORAL
Qty: 0 | Refills: 0 | COMMUNITY

## 2018-03-08 RX ORDER — ATORVASTATIN CALCIUM 80 MG/1
1 TABLET, FILM COATED ORAL
Qty: 30 | Refills: 0 | OUTPATIENT
Start: 2018-03-08 | End: 2018-04-06

## 2018-03-08 RX ORDER — PRASUGREL 5 MG/1
1 TABLET, FILM COATED ORAL
Qty: 30 | Refills: 0 | OUTPATIENT
Start: 2018-03-08 | End: 2018-04-06

## 2018-03-08 RX ORDER — ROSUVASTATIN CALCIUM 5 MG/1
10 TABLET ORAL
Qty: 0 | Refills: 0 | COMMUNITY

## 2018-03-08 RX ORDER — LISINOPRIL 2.5 MG/1
1 TABLET ORAL
Qty: 30 | Refills: 0
Start: 2018-03-08 | End: 2018-04-06

## 2018-03-08 RX ORDER — TICAGRELOR 90 MG/1
1 TABLET ORAL
Qty: 60 | Refills: 0 | OUTPATIENT
Start: 2018-03-08 | End: 2018-04-06

## 2018-03-08 RX ORDER — PANTOPRAZOLE SODIUM 20 MG/1
1 TABLET, DELAYED RELEASE ORAL
Qty: 30 | Refills: 0 | OUTPATIENT
Start: 2018-03-08

## 2018-03-08 RX ORDER — FENOFIBRATE,MICRONIZED 130 MG
145 CAPSULE ORAL
Qty: 0 | Refills: 0 | COMMUNITY

## 2018-03-08 RX ADMIN — Medication 650 MILLIGRAM(S): at 06:15

## 2018-03-08 RX ADMIN — PRASUGREL 60 MILLIGRAM(S): 5 TABLET, FILM COATED ORAL at 12:06

## 2018-03-08 RX ADMIN — PANTOPRAZOLE SODIUM 40 MILLIGRAM(S): 20 TABLET, DELAYED RELEASE ORAL at 12:05

## 2018-03-08 RX ADMIN — Medication 81 MILLIGRAM(S): at 12:13

## 2018-03-08 RX ADMIN — LISINOPRIL 5 MILLIGRAM(S): 2.5 TABLET ORAL at 06:51

## 2018-03-08 RX ADMIN — Medication 25 MILLIGRAM(S): at 06:52

## 2018-03-08 RX ADMIN — ESCITALOPRAM OXALATE 10 MILLIGRAM(S): 10 TABLET, FILM COATED ORAL at 12:06

## 2018-03-08 RX ADMIN — TICAGRELOR 90 MILLIGRAM(S): 90 TABLET ORAL at 06:51

## 2018-03-08 NOTE — DISCHARGE NOTE ADULT - CARE PROVIDER_API CALL
Ayaz Ruiz), Cardiovascular Disease; Interventional Cardiology  [address] [place] [address]  Phone: [phone]  Fax: [phone]    Sonya Martinez  known to patient  Phone: (   )    -  Fax: (   )    -

## 2018-03-08 NOTE — DISCHARGE NOTE ADULT - HOSPITAL COURSE
46 yo male patient with PMHx of CAD s/p CABG x4 vessels in 2015, presented because of the above chief complaint.  History goes back to the day of presentation, when patient started experiencing retrosternal oppressive chest pain, at rest, radiating to both shoulders and back, 5/10 in intensity, no SOB, no palpitations, no sweating, no nausea or vomiting. Pain lasted for like an hour, he took 2 aspirins, and then pain improved. currently it is waxing and waning, comes for few minutes and then goes away. It is not related to exertion or position.  Patient says that since the open heart surgery, he did not have any chest pain, was able to ambulate and climb the stairs with no pain.    This is his first episode since his surgery.  No other issues (06 Mar 2018 17:08)     Working diagnosis: Non STEMI  Treat as ACS: Asa, plavix, statin, betablockers, ace- i, anticoag  2d echo to assess LV function: good systolic function   rising troponin 0.07-->0.23--> 1.83,   s/p cardiac cath and stents in LIMA and LAD  anastomosis    PLAN; pt is being discharged after clearance from cardiology on DAPT,statin ,BB and ACE i  follow up in 1 week

## 2018-03-08 NOTE — PROGRESS NOTE ADULT - SUBJECTIVE AND OBJECTIVE BOX
LENGTH OF HOSPITAL STAY: 2d  s/p  cardiac cath     CHIEF COMPLAINT:   Patient is a 47y old  Male who presents with a chief complaint of Chest pain on the day of presentation (06 Mar 2018 17:08)      HISTORY OF PRESENTING ILLNESS:    HPI:  48 yo male patient with PMHx of CAD s/p CABG x4 vessels in 2015, presented because of the above chief complaint.  History goes back to the day of presentation, when patient started experiencing retrosternal oppressive chest pain, at rest, radiating to both shoulders and back, 5/10 in intensity, no SOB, no palpitations, no sweating, no nausea or vomiting. Pain lasted for like an hour, he took 2 aspirins, and then pain improved. currently it is waxing and waning, comes for few minutes and then goes away. It is not related to exertion or position.  Patient says that since the open heart surgery, he did not have any chest pain, was able to ambulate and climb the stairs with no pain.  This is his first episode since his surgery.  No other issues (06 Mar 2018 17:08)    PAST MEDICAL & SURGICAL HISTORY  PAST MEDICAL & SURGICAL HISTORY:  Coronary artery disease  High cholesterol  Hypertension, unspecified type  History of quadruple bypass  History of appendectomy    SOCIAL HISTORY:  ex smoker quit 20 yr back  occasional alcohol  ALLERGIES:  No Known Allergies    Home Medications:  aspirin: 81 milligram(s) orally once a day (06 Mar 2018 17:16)  Crestor: 10 milligram(s) orally once a day (at bedtime) (06 Mar 2018 17:19)  fenofibrate: 145 milligram(s) orally once a day (06 Mar 2018 17:19)  Lexapro: 10 milligram(s) orally once a day (06 Mar 2018 17:19)  lisinopril: 2.5 milligram(s) orally once a day (06 Mar 2018 17:19)  metoprolol: 25 milligram(s) orally 2 times a day (06 Mar 2018 17:20)    MEDICATIONS:  MEDICATIONS  (STANDING):  aspirin  chewable 81 milliGRAM(s) Oral daily  atorvastatin 80 milliGRAM(s) Oral at bedtime  escitalopram 10 milliGRAM(s) Oral daily  lisinopril 5 milliGRAM(s) Oral daily  metoprolol     tartrate 25 milliGRAM(s) Oral two times a day  pantoprazole    Tablet 40 milliGRAM(s) Oral before breakfast  senna 2 Tablet(s) Oral at bedtime  sodium chloride 0.9%. 1000 milliLiter(s) (100 mL/Hr) IV Continuous <Continuous>  ticagrelor 90 milliGRAM(s) Oral two times a day    MEDICATIONS  (PRN):  acetaminophen   Tablet. 650 milliGRAM(s) Oral every 6 hours PRN Mild Pain (1 - 3)  nitroglycerin     SubLingual 0.4 milliGRAM(s) SubLingual every 5 minutes PRN Chest Pain  VITALS:   ICU Vital Signs Last 24 Hrs  T(C): 36.3 (08 Mar 2018 04:00), Max: 37.2 (07 Mar 2018 20:00)  T(F): 97.4 (08 Mar 2018 04:00), Max: 98.9 (07 Mar 2018 20:00)  HR: 70 (08 Mar 2018 06:00) (70 - 90)  BP: 105/66 (08 Mar 2018 06:00) (99/71 - 985/-)  BP(mean): 86 (08 Mar 2018 06:00) (86 - 101)  ABP: --  ABP(mean): --  RR: 18 (08 Mar 2018 06:00) (14 - 28)  SpO2: 98% (08 Mar 2018 06:00) (94% - 100%)    LABS:                                 14.1   10.00 )-----------( 247      ( 08 Mar 2018 02:23 )               40.3     03-08    136  |  105  |  15  ----------------------------<  106  3.6   |  22  |  1.1    Ca    9.1      08 Mar 2018 02:23    TPro  7.0  /  Alb  4.6  /  TBili  0.8  /  DBili  x   /  AST  26  /  ALT  34  /  AlkPhos  47  03-06      PTT - ( 06 Mar 2018 12:56 )  PTT:27.6 sec      Creatine Kinase, Serum: 269 U/L <H> (03-07-18 @ 07:03)  Troponin I, Serum: 1.88 ng/mL <HH> (03-07-18 @ 07:03)      CARDIAC MARKERS ( 07 Mar 2018 07:03 )  1.88 ng/mL / x     / 269 U/L / x     / 10.8 ng/mL  CARDIAC MARKERS ( 06 Mar 2018 16:32 )  0.23 ng/mL / x     / 168 U/L / x     / 3.2 ng/mL  CARDIAC MARKERS ( 06 Mar 2018 12:56 )  0.07 ng/mL / x     / 150 U/L / x     / 1.4 ng/mL    Lipid Profile in AM (03.07.18 @ 07:03)    Total Cholesterol/HDL Ratio Measurement: 3.5 Ratio    Cholesterol, Serum: 163 mg/dL    Triglycerides, Serum: 131 mg/dL    HDL Cholesterol, Serum: 47 mg/dL    Direct LDL: 97: LDL Cholesterol ---   Hemoglobin A1C, Whole Blood: 5.5 % (03.07.18 @ 07:03)        RADIOLOGY:  < from: Xray Chest 2 Views PA/Lat (03.06.18 @ 13:41) >      < end of copied text >    PHYSICAL EXAM:  GEN: No acute distress  HEENT: within normal range  LUNGS: Clear to auscultation bilaterally   HEART: S1/S2 present. RRR.   ABD: Soft, non-tender, non-distended. Bowel sounds present  EXT:right femoral site ,claen ,mild swelling noticed  NEURO: AAOX3 LENGTH OF HOSPITAL STAY: 2d  s/p  cardiac cath     CHIEF COMPLAINT:   Patient is a 47y old  Male who presents with a chief complaint of Chest pain on the day of presentation (06 Mar 2018 17:08)      HISTORY OF PRESENTING ILLNESS:    HPI:  46 yo male patient with PMHx of CAD s/p CABG x4 vessels in 2015, presented because of the above chief complaint.  History goes back to the day of presentation, when patient started experiencing retrosternal oppressive chest pain, at rest, radiating to both shoulders and back, 5/10 in intensity, no SOB, no palpitations, no sweating, no nausea or vomiting. Pain lasted for like an hour, he took 2 aspirins, and then pain improved. currently it is waxing and waning, comes for few minutes and then goes away. It is not related to exertion or position.  Patient says that since the open heart surgery, he did not have any chest pain, was able to ambulate and climb the stairs with no pain.  This is his first episode since his surgery.  No other issues (06 Mar 2018 17:08)    PAST MEDICAL & SURGICAL HISTORY  PAST MEDICAL & SURGICAL HISTORY:  Coronary artery disease  High cholesterol  Hypertension, unspecified type  History of quadruple bypass  History of appendectomy    SOCIAL HISTORY:  ex smoker quit 20 yr back  occasional alcohol  ALLERGIES:  No Known Allergies    Home Medications:  aspirin: 81 milligram(s) orally once a day (06 Mar 2018 17:16)  Crestor: 10 milligram(s) orally once a day (at bedtime) (06 Mar 2018 17:19)  fenofibrate: 145 milligram(s) orally once a day (06 Mar 2018 17:19)  Lexapro: 10 milligram(s) orally once a day (06 Mar 2018 17:19)  lisinopril: 2.5 milligram(s) orally once a day (06 Mar 2018 17:19)  metoprolol: 25 milligram(s) orally 2 times a day (06 Mar 2018 17:20)    MEDICATIONS:  MEDICATIONS  (STANDING):  aspirin  chewable 81 milliGRAM(s) Oral daily  atorvastatin 80 milliGRAM(s) Oral at bedtime  escitalopram 10 milliGRAM(s) Oral daily  lisinopril 5 milliGRAM(s) Oral daily  metoprolol     tartrate 25 milliGRAM(s) Oral two times a day  pantoprazole    Tablet 40 milliGRAM(s) Oral before breakfast  senna 2 Tablet(s) Oral at bedtime  sodium chloride 0.9%. 1000 milliLiter(s) (100 mL/Hr) IV Continuous <Continuous>  Effient 10 mg Q24 hr    MEDICATIONS  (PRN):  acetaminophen   Tablet. 650 milliGRAM(s) Oral every 6 hours PRN Mild Pain (1 - 3)  nitroglycerin     SubLingual 0.4 milliGRAM(s) SubLingual every 5 minutes PRN Chest Pain    VITALS:   ICU Vital Signs Last 24 Hrs  T(C): 36.3 (08 Mar 2018 04:00), Max: 37.2 (07 Mar 2018 20:00)  T(F): 97.4 (08 Mar 2018 04:00), Max: 98.9 (07 Mar 2018 20:00)  HR: 70 (08 Mar 2018 06:00) (70 - 90)  BP: 105/66 (08 Mar 2018 06:00) (99/71 - 985/-)  BP(mean): 86 (08 Mar 2018 06:00) (86 - 101)  ABP: --  ABP(mean): --  RR: 18 (08 Mar 2018 06:00) (14 - 28)  SpO2: 98% (08 Mar 2018 06:00) (94% - 100%)    LABS:                                 14.1   10.00 )-----------( 247      ( 08 Mar 2018 02:23 )               40.3     03-08    136  |  105  |  15  ----------------------------<  106  3.6   |  22  |  1.1    Ca    9.1      08 Mar 2018 02:23    TPro  7.0  /  Alb  4.6  /  TBili  0.8  /  DBili  x   /  AST  26  /  ALT  34  /  AlkPhos  47  03-06      PTT - ( 06 Mar 2018 12:56 )  PTT:27.6 sec      Creatine Kinase, Serum: 269 U/L <H> (03-07-18 @ 07:03)  Troponin I, Serum: 1.88 ng/mL <HH> (03-07-18 @ 07:03)      CARDIAC MARKERS ( 07 Mar 2018 07:03 )  1.88 ng/mL / x     / 269 U/L / x     / 10.8 ng/mL  CARDIAC MARKERS ( 06 Mar 2018 16:32 )  0.23 ng/mL / x     / 168 U/L / x     / 3.2 ng/mL  CARDIAC MARKERS ( 06 Mar 2018 12:56 )  0.07 ng/mL / x     / 150 U/L / x     / 1.4 ng/mL    Lipid Profile in AM (03.07.18 @ 07:03)    Total Cholesterol/HDL Ratio Measurement: 3.5 Ratio    Cholesterol, Serum: 163 mg/dL    Triglycerides, Serum: 131 mg/dL    HDL Cholesterol, Serum: 47 mg/dL    Direct LDL: 97: LDL Cholesterol ---   Hemoglobin A1C, Whole Blood: 5.5 % (03.07.18 @ 07:03)    < from: Transthoracic Echocardiogram (03.07.18 @ 12:47) >  1. Normal global left ventricular systolic function.   2. LV Ejection Fraction by Coon's Method with a biplane EF of 56 %.      < end of copied text >      RADIOLOGY:  < from: Xray Chest 2 Views PA/Lat (03.06.18 @ 13:41) >      < end of copied text >    PHYSICAL EXAM:  GEN: No acute distress  HEENT: within normal range  LUNGS: Clear to auscultation bilaterally   HEART: S1/S2 present. RRR.   ABD: Soft, non-tender, non-distended. Bowel sounds present  EXT:right femoral site ,clean ,no hematoma  NEURO: AAOX3

## 2018-03-08 NOTE — PROGRESS NOTE ADULT - ASSESSMENT
48 yo male patient with PMHx of CAD s/p CABG in 2015 presenting because of acute onset retrosternal chest pain at rest, on the day of presentation.    # Working diagnosis: Non STEMI  Treat as ACS: Asa, plavix, statin, betablockers, ace- i, anticoag  2d echo to assess LV function: pending   rising troponin 0.07-->0.23--> 1.83,   s/p cardiac cath and stents in LIMA and LIMA anastomosis  check CBC and cardiac enzymes in am  -watch for hematoma    # DVT proph:     # GI proph: cw protonics    # Diet: DASH  # Activity: As tolerated  # Full code  # Dispo: home 48 yo male patient with PMHx of CAD s/p CABG in 2015 presenting because of acute onset retrosternal chest pain at rest, on the day of presentation.    # Working diagnosis: Non STEMI  Treat as ACS: Asa, plavix, statin, betablockers, ace- i, anticoag  2d echo to assess LV function: good systolic function   rising troponin 0.07-->0.23--> 1.83,   s/p cardiac cath and stents in LIMA and LAD  anastomosis  check CBC and cardiac enzymes in am  -watch for hematoma    # DVT proph:     # GI proph: cw protonics    # Diet: DASH  # Activity: As tolerated  # Full code  # Dispo: home      PLAN; pt is being discharged after clearance from cardiology on DAPT,statin ,BB and ACE i  follow up in 1 week

## 2018-03-08 NOTE — PROGRESS NOTE ADULT - ATTENDING COMMENTS
Patient seen and examined independently in CCU. I agree with the resident's note, physical exam, and plan except as below.  #NSTEMI - sp cath and PCI to anastomosis  of LIMA to LAD - placed on asa and brillinta  c/o  mild SOB this AM - now resolved - ambulating well - Echo WNL - possible side effect of brillinta but tolerating well  #CAD/CABG - diet lifestyle modification and medication compliance explained to pt    MEDICATIONS  (STANDING):  aspirin  chewable 81 milliGRAM(s) Oral daily  atorvastatin 80 milliGRAM(s) Oral at bedtime  escitalopram 10 milliGRAM(s) Oral daily  lisinopril 5 milliGRAM(s) Oral daily  metoprolol     tartrate 25 milliGRAM(s) Oral two times a day  pantoprazole    Tablet 40 milliGRAM(s) Oral before breakfast  senna 2 Tablet(s) Oral at bedtime  sodium chloride 0.9%. 1000 milliLiter(s) (100 mL/Hr) IV Continuous <Continuous>  ticagrelor 90 milliGRAM(s) Oral two times a day    MEDICATIONS  (PRN):  acetaminophen   Tablet. 650 milliGRAM(s) Oral every 6 hours PRN Mild Pain (1 - 3)  nitroglycerin     SubLingual 0.4 milliGRAM(s) SubLingual every 5 minutes PRN Chest Pain      #right groin site intact  #awaiting Cardio Dr Ruiz for clearance - possible today or tomorrow

## 2018-03-08 NOTE — DISCHARGE NOTE ADULT - PLAN OF CARE
optimal heart function take meds and follow up with cardiologist complete resolution s/p cardiac cath and stent optimal blood pressure control take meds and low salt diet optimal blood cholesterol take meds and low fat diet

## 2018-03-08 NOTE — PROGRESS NOTE ADULT - ASSESSMENT
A/P:  I discussed the case with Interventional Cardiologist Dr. Ruiz   & recommends the following:    S/P PCI LIMA to LAD  	         Continue DAPT,  B-Blocker, Statin Therapy , pt reports slight SOB possibly due to brilinta, will review with Dr. Sara thompson-, ambulate, EF NL, CE trending down                   Pt given instructions on importance of taking antiplatelet medication or risk acute stent thrombosis/death                   Post cath instructions, access site care and activity restrictions reviewed with patient                     Discussed with patient to return to hospital if experience chest pain, shortness breath, dizziness and site bleeding                   Aggressive risk factor modification, diet counseling, smoking cessation discussed with patient                                           Follow up with Cardiology Dr. Ruiz in one week after discharge,  Instructed to call and make an appointment A/P:  I discussed the case with Interventional Cardiologist Dr. Ruiz   & recommends the following:    S/P PCI LIMA to LAD  	         Continue DAPT,  B-Blocker, Statin Therapy , pt reports slight SOB possibly due to brilinta, will review with Dr. Ruiz, d/c brillinta, give loading dose of effient 60mgx1 now, then 10mg po daily starting 3/9                    effient coverage $0 copay, cancelled RX for brilinta with pharmacy                    oob-ch, ambulate, EF NL, CE trending down                   Pt given instructions on importance of taking antiplatelet medication or risk acute stent thrombosis/death                   Post cath instructions, access site care and activity restrictions reviewed with patient                     Discussed with patient to return to hospital if experience chest pain, shortness breath, dizziness and site bleeding                   Aggressive risk factor modification, diet counseling, smoking cessation discussed with patient                       d.c home as per Dr. Ruiz                    Follow up with Cardiology Dr. Ruiz in one week after discharge,  Instructed to call and make an appointment

## 2018-03-08 NOTE — PROGRESS NOTE ADULT - SUBJECTIVE AND OBJECTIVE BOX
Cardiology Follow up    MIGDALIA FITZPATRICK   47yMale  PAST MEDICAL & SURGICAL HISTORY:  Coronary artery disease  High cholesterol  Hypertension, unspecified type  History of quadruple bypass  History of appendectomy    Allergies    No Known Allergies    Intolerances        Patient reports slight SOB at rest  Denies CP, palpitations, or dizziness  No events on telemetry overnight    Vital Signs Last 24 Hrs  T(C): 36.3 (08 Mar 2018 04:00), Max: 37.2 (07 Mar 2018 20:00)  T(F): 97.4 (08 Mar 2018 04:00), Max: 98.9 (07 Mar 2018 20:00)  HR: 70 (08 Mar 2018 06:00) (70 - 90)  BP: 105/66 (08 Mar 2018 06:00) (99/71 - 985/-)  BP(mean): 86 (08 Mar 2018 06:00) (86 - 101)  RR: 18 (08 Mar 2018 06:00) (14 - 28)  SpO2: 98% (08 Mar 2018 06:00) (94% - 100%)Allergies    REVIEW OF SYSTEMS:    CONSTITUTIONAL: No weakness, fevers or chills  EYES/ENT: No visual changes;  No vertigo or throat pain   NECK: No pain or stiffness  RESPIRATORY: slight shortness of breath at rest (possibly due to brilinta?)  CARDIOVASCULAR: No chest pain or palpitations  GASTROINTESTINAL: No abdominal or epigastric pain. No nausea, vomiting, or hematemesis; No diarrhea or constipation. No melena or hematochezia.  GENITOURINARY: No dysuria, frequency or hematuria  NEUROLOGICAL: No numbness or weakness  SKIN: No itching, rashes        NAD, appears well  S1S2, no murmurs, no JVD  CTA B/L, no wheeze, no rales  SNT +BS  Ext:    Right/  Groin:  NO hematoma,     NO bruit, dressing; C/D/I , + pulses  Pulses:  +Rad/ +PTs /+DPs/ same as baseline  A&Ox 3    EKG    NSR, no ischemic changes                                                                                                               2D ECHO  Procedure:     2D Echo/Doppler/Color Doppler Complete.  Indications:   R07.9 - Chest Pain, unspecified  Study Details: Technically fair study. Study quality was adversely   affected due                 to body habitus.         Summary:   1. Normal global left ventricular systolic function.   2. LV Ejection Fraction by Coon's Method with a biplane EF of 56 %.    PHYSICIAN INTERPRETATION:  Left Ventricle: The left ventricular internal cavity size is normal. Left   ventricular wall thickness is normal. Global LV systolic function was   normal. LV Ejection Fraction by Coon's Method with a biplane EF of 56   %.  Right Ventricle: Normal right ventricular size and function.  Left Atrium: Normal left atrial size.  Right Atrium: Normal right atrial size.  Pericardium: There is no evidence of pericardial effusion.  Mitral Valve: Trace mitral valve regurgitation is seen.  Tricuspid Valve: Mild tricuspid regurgitation is visualized.  Aortic Valve: No evidence of aortic stenosis. Trivial aortic valve   regurgitation is seen.  Aorta: The aortic root is normal in size and structure.  Pulmonary Artery: Normal pulmonary artery pressure.       2D AND M-MODE MEASUREMENTS (normal ranges within parentheses):  Left                  Normal   Aorta/Left             Normal  Ventricle:                     Atrium:  IVSd (2D):  0.88 cm  (0.7-1.1) AoV Cusp       2.31  (1.5-2.6)  LVPWd    1.00 cm  (0.7-1.1) Separation:     cm  (2D):                          Left Atrium    3.72  (1.9-4.0)  LVIDd       5.12 cm  (3.4-5.7) (Mmode):        cm  (2D):                          LA Volume      16.4  LVIDs       2.51 cm            Index   ml/m²  (2D):                          Right  LV FS       50.9 %    (>25%)   Ventricle:  (2D):                          RVd (Mmode):   2.35 cm  IVSd        1.03 cm  (0.7-1.1) RVd (2D):      3.32 cm  (Mmode):  LVPWd       1.03 cm  (0.7-1.1)  (Mmode):  LVIDd       4.61 cm  (3.4-5.7)  (Mmode):  LVIDs       3.29 cm  (Mmode):  LV FS       28.6 %    (>25%)  (Mmode):  Relative     0.39     (<0.42)  Wall  Thickness  Rel. Wall    0.45     (<0.42)  Thickness  Mm  LV Mass    83.5 g/m²  Index:  Mmode    SPECTRAL DOPPLER ANALYSIS:  LV DIASTOLIC FUNCTION:  MV Peak E: 0.60 m/s Decel Time: 266 msec  MV Peak A: 0.58 m/s  E/A Ratio: 1.05    Aortic Valve:  AoV VMax:    1.15 m/s AoV Area, Vmax: 3.81 cm² Vmax Indx: 1.90 cm²/m²  AoV Pk Grad: 5.3 mmHg    LVOT Vmax: 1.16 m/s  LVOT VTI:  0.22 m  LVOT Diam: 2.19 cm    Mitral Valve:  MV P1/2 Time: 77.00 msec  MV Area, PHT: 2.86 cm²    Tricuspid Valve and PA/RV Systolic Pressure: TR Max Velocity: 2.31 m/s RA   Pressure: 10 mmHg RVSP/PASP: 31.4 mmHg    Pulmonic Valve:  PV Max Velocity: 0.90 m/s PV Max PG: 3.3 mmHg PV Mean PG:       [id number] Frandy Ricketts M.D. Electronically signed on 3/7/201    LABS                        14.3   8.50  )-----------( 241      ( 08 Mar 2018 04:44 )             40.9     03-08    140  |  110  |  16  ----------------------------<  105  3.9   |  24  |  1.1    Ca    9.6      08 Mar 2018 04:44  Mg     2.1     03-08    TPro  7.0  /  Alb  4.6  /  TBili  0.8  /  DBili  x   /  AST  26  /  ALT  34  /  AlkPhos  47  03-06    CARDIAC MARKERS ( 08 Mar 2018 04:44 )  1.72 ng/mL / x     / 189 U/L / x     / 5.1 ng/mL  CARDIAC MARKERS ( 07 Mar 2018 07:03 )  1.88 ng/mL / x     / 269 U/L / x     / 10.8 ng/mL  CARDIAC MARKERS ( 06 Mar 2018 16:32 )  0.23 ng/mL / x     / 168 U/L / x     / 3.2 ng/mL  CARDIAC MARKERS ( 06 Mar 2018 12:56 )  0.07 ng/mL / x     / 150 U/L / x     / 1.4 ng/mL      Magnesium, Serum: 2.1 mg/dL [1.8 - 2.4] (03-08-18 @ 04:44)  LIVER FUNCTIONS - ( 06 Mar 2018 12:56 )  Alb: 4.6 g/dL / Pro: 7.0 g/dL / ALK PHOS: 47 U/L / ALT: 34 U/L / AST: 26 U/L / GGT: x             A/P:  I discussed the case with Interventional Cardiologist Dr. Ruiz   & recommends the following:    S/P PCI LIMA to LAD  	         Continue DAPT,  B-Blocker, Statin Therapy , pt reports slight SOB possibly due to brilinta, will review with Dr. Ruiz                    [facility], ambulate                   Pt given instructions on importance of taking antiplatelet medication or risk acute stent thrombosis/death                   Post cath instructions, access site care and activity restrictions reviewed with patient                     Discussed with patient to return to hospital if experience chest pain, shortness breath, dizziness and site bleeding                   Aggressive risk factor modification, diet counseling, smoking cessation discussed with patient                                           Follow up with Cardiology Dr. Ruiz in one week after discharge,  Instructed to call and make an appointment Cardiology Follow up    MIGDALIA FITZPATRICK   47yMale  PAST MEDICAL & SURGICAL HISTORY:  Coronary artery disease  High cholesterol  Hypertension, unspecified type  History of quadruple bypass  History of appendectomy    Allergies    No Known Allergies    Intolerances        Patient reports slight SOB at rest  Denies CP, palpitations, or dizziness  No events on telemetry overnight    Vital Signs Last 24 Hrs  T(C): 36.3 (08 Mar 2018 04:00), Max: 37.2 (07 Mar 2018 20:00)  T(F): 97.4 (08 Mar 2018 04:00), Max: 98.9 (07 Mar 2018 20:00)  HR: 70 (08 Mar 2018 06:00) (70 - 90)  BP: 105/66 (08 Mar 2018 06:00) (99/71 - 985/-)  BP(mean): 86 (08 Mar 2018 06:00) (86 - 101)  RR: 18 (08 Mar 2018 06:00) (14 - 28)  SpO2: 98% (08 Mar 2018 06:00) (94% - 100%)Allergies    REVIEW OF SYSTEMS:    CONSTITUTIONAL: No weakness, fevers or chills  EYES/ENT: No visual changes;  No vertigo or throat pain   NECK: No pain or stiffness  RESPIRATORY: slight shortness of breath at rest (possibly due to brilinta?)  CARDIOVASCULAR: No chest pain or palpitations  GASTROINTESTINAL: No abdominal or epigastric pain. No nausea, vomiting, or hematemesis; No diarrhea or constipation. No melena or hematochezia.  GENITOURINARY: No dysuria, frequency or hematuria  NEUROLOGICAL: No numbness or weakness  SKIN: No itching, rashes        NAD, appears well  S1S2, no murmurs, no JVD  CTA B/L, no wheeze, no rales  SNT +BS  Ext:    Right/  Groin:  NO hematoma,     NO bruit, dressing; C/D/I , + pulses  Pulses:  +Rad/ +PTs /+DPs/ same as baseline  A&Ox 3    EKG    NSR, no ischemic changes                                                                                                               2D ECHO  Procedure:     2D Echo/Doppler/Color Doppler Complete.  Indications:   R07.9 - Chest Pain, unspecified  Study Details: Technically fair study. Study quality was adversely   affected due                 to body habitus.         Summary:   1. Normal global left ventricular systolic function.   2. LV Ejection Fraction by Coon's Method with a biplane EF of 56 %.    PHYSICIAN INTERPRETATION:  Left Ventricle: The left ventricular internal cavity size is normal. Left   ventricular wall thickness is normal. Global LV systolic function was   normal. LV Ejection Fraction by Coon's Method with a biplane EF of 56   %.  Right Ventricle: Normal right ventricular size and function.  Left Atrium: Normal left atrial size.  Right Atrium: Normal right atrial size.  Pericardium: There is no evidence of pericardial effusion.  Mitral Valve: Trace mitral valve regurgitation is seen.  Tricuspid Valve: Mild tricuspid regurgitation is visualized.  Aortic Valve: No evidence of aortic stenosis. Trivial aortic valve   regurgitation is seen.  Aorta: The aortic root is normal in size and structure.  Pulmonary Artery: Normal pulmonary artery pressure.       2D AND M-MODE MEASUREMENTS (normal ranges within parentheses):  Left                  Normal   Aorta/Left             Normal  Ventricle:                     Atrium:  IVSd (2D):  0.88 cm  (0.7-1.1) AoV Cusp       2.31  (1.5-2.6)  LVPWd    1.00 cm  (0.7-1.1) Separation:     cm  (2D):                          Left Atrium    3.72  (1.9-4.0)  LVIDd       5.12 cm  (3.4-5.7) (Mmode):        cm  (2D):                          LA Volume      16.4  LVIDs       2.51 cm            Index   ml/m²  (2D):                          Right  LV FS       50.9 %    (>25%)   Ventricle:  (2D):                          RVd (Mmode):   2.35 cm  IVSd        1.03 cm  (0.7-1.1) RVd (2D):      3.32 cm  (Mmode):  LVPWd       1.03 cm  (0.7-1.1)  (Mmode):  LVIDd       4.61 cm  (3.4-5.7)  (Mmode):  LVIDs       3.29 cm  (Mmode):  LV FS       28.6 %    (>25%)  (Mmode):  Relative     0.39     (<0.42)  Wall  Thickness  Rel. Wall    0.45     (<0.42)  Thickness  Mm  LV Mass    83.5 g/m²  Index:  Mmode    SPECTRAL DOPPLER ANALYSIS:  LV DIASTOLIC FUNCTION:  MV Peak E: 0.60 m/s Decel Time: 266 msec  MV Peak A: 0.58 m/s  E/A Ratio: 1.05    Aortic Valve:  AoV VMax:    1.15 m/s AoV Area, Vmax: 3.81 cm² Vmax Indx: 1.90 cm²/m²  AoV Pk Grad: 5.3 mmHg    LVOT Vmax: 1.16 m/s  LVOT VTI:  0.22 m  LVOT Diam: 2.19 cm    Mitral Valve:  MV P1/2 Time: 77.00 msec  MV Area, PHT: 2.86 cm²    Tricuspid Valve and PA/RV Systolic Pressure: TR Max Velocity: 2.31 m/s RA   Pressure: 10 mmHg RVSP/PASP: 31.4 mmHg    Pulmonic Valve:  PV Max Velocity: 0.90 m/s PV Max PG: 3.3 mmHg PV Mean PG:       [id number] Frandy Ricketts M.D. Electronically signed on 3/7/201    LABS                        14.3   8.50  )-----------( 241      ( 08 Mar 2018 04:44 )             40.9     03-08    140  |  110  |  16  ----------------------------<  105  3.9   |  24  |  1.1    Ca    9.6      08 Mar 2018 04:44  Mg     2.1     03-08    TPro  7.0  /  Alb  4.6  /  TBili  0.8  /  DBili  x   /  AST  26  /  ALT  34  /  AlkPhos  47  03-06    CARDIAC MARKERS ( 08 Mar 2018 04:44 )  1.72 ng/mL / x     / 189 U/L / x     / 5.1 ng/mL  CARDIAC MARKERS ( 07 Mar 2018 07:03 )  1.88 ng/mL / x     / 269 U/L / x     / 10.8 ng/mL  CARDIAC MARKERS ( 06 Mar 2018 16:32 )  0.23 ng/mL / x     / 168 U/L / x     / 3.2 ng/mL  CARDIAC MARKERS ( 06 Mar 2018 12:56 )  0.07 ng/mL / x     / 150 U/L / x     / 1.4 ng/mL      Magnesium, Serum: 2.1 mg/dL [1.8 - 2.4] (03-08-18 @ 04:44)  LIVER FUNCTIONS - ( 06 Mar 2018 12:56 )  Alb: 4.6 g/dL / Pro: 7.0 g/dL / ALK PHOS: 47 U/L / ALT: 34 U/L / AST: 26 U/L / GGT: x             A/P:  I discussed the case with Interventional Cardiologist Dr. Ruiz   & recommends the following:    S/P PCI LIMA to LAD  	         Continue DAPT,  B-Blocker, Statin Therapy , pt reports slight SOB possibly due to brilinta, will review with Dr. Ruiz, will stop brilinta and start effient,, give loading dose of effient 60mg po x 1 now, then 10mg po daily starting 3/9                    oob-ch, ambulate                   Pt given instructions on importance of taking antiplatelet medication or risk acute stent thrombosis/death                   Post cath instructions, access site care and activity restrictions reviewed with patient                     Discussed with patient to return to hospital if experience chest pain, shortness breath, dizziness and site bleeding                   Aggressive risk factor modification, diet counseling, smoking cessation discussed with patient                       d/c pt home as per Dr. Ruiz                    Follow up with Cardiology Dr. Ruiz in one week after discharge,  Instructed to call and make an appointment Cardiology Follow up    MIGDALIA FITZPATRICK   47yMale  PAST MEDICAL & SURGICAL HISTORY:  Coronary artery disease  High cholesterol  Hypertension, unspecified type  History of quadruple bypass  History of appendectomy    Allergies    No Known Allergies    Intolerances        Patient reports slight SOB at rest  Denies CP, palpitations, or dizziness  No events on telemetry overnight    Vital Signs Last 24 Hrs  T(C): 36.3 (08 Mar 2018 04:00), Max: 37.2 (07 Mar 2018 20:00)  T(F): 97.4 (08 Mar 2018 04:00), Max: 98.9 (07 Mar 2018 20:00)  HR: 70 (08 Mar 2018 06:00) (70 - 90)  BP: 105/66 (08 Mar 2018 06:00) (99/71 - 985/-)  BP(mean): 86 (08 Mar 2018 06:00) (86 - 101)  RR: 18 (08 Mar 2018 06:00) (14 - 28)  SpO2: 98% (08 Mar 2018 06:00) (94% - 100%)Allergies    REVIEW OF SYSTEMS:    CONSTITUTIONAL: No weakness, fevers or chills  EYES/ENT: No visual changes;  No vertigo or throat pain   NECK: No pain or stiffness  RESPIRATORY: slight shortness of breath at rest (possibly due to brilinta?)  CARDIOVASCULAR: No chest pain or palpitations  GASTROINTESTINAL: No abdominal or epigastric pain. No nausea, vomiting, or hematemesis; No diarrhea or constipation. No melena or hematochezia.  GENITOURINARY: No dysuria, frequency or hematuria  NEUROLOGICAL: No numbness or weakness  SKIN: No itching, rashes        NAD, appears well  S1S2, no murmurs, no JVD  CTA B/L, no wheeze, no rales  SNT +BS  Ext:    Right/  Groin:  NO hematoma,     NO bruit, dressing; C/D/I , + pulses  Pulses:  +Rad/ +PTs /+DPs/ same as baseline  A&Ox 3    EKG    NSR, no ischemic changes                                                                                                               2D ECHO  Procedure:     2D Echo/Doppler/Color Doppler Complete.  Indications:   R07.9 - Chest Pain, unspecified  Study Details: Technically fair study. Study quality was adversely   affected due                 to body habitus.         Summary:   1. Normal global left ventricular systolic function.   2. LV Ejection Fraction by Coon's Method with a biplane EF of 56 %.    PHYSICIAN INTERPRETATION:  Left Ventricle: The left ventricular internal cavity size is normal. Left   ventricular wall thickness is normal. Global LV systolic function was   normal. LV Ejection Fraction by Coon's Method with a biplane EF of 56   %.  Right Ventricle: Normal right ventricular size and function.  Left Atrium: Normal left atrial size.  Right Atrium: Normal right atrial size.  Pericardium: There is no evidence of pericardial effusion.  Mitral Valve: Trace mitral valve regurgitation is seen.  Tricuspid Valve: Mild tricuspid regurgitation is visualized.  Aortic Valve: No evidence of aortic stenosis. Trivial aortic valve   regurgitation is seen.  Aorta: The aortic root is normal in size and structure.  Pulmonary Artery: Normal pulmonary artery pressure.       2D AND M-MODE MEASUREMENTS (normal ranges within parentheses):  Left                  Normal   Aorta/Left             Normal  Ventricle:                     Atrium:  IVSd (2D):  0.88 cm  (0.7-1.1) AoV Cusp       2.31  (1.5-2.6)  LVPWd    1.00 cm  (0.7-1.1) Separation:     cm  (2D):                          Left Atrium    3.72  (1.9-4.0)  LVIDd       5.12 cm  (3.4-5.7) (Mmode):        cm  (2D):                          LA Volume      16.4  LVIDs       2.51 cm            Index   ml/m²  (2D):                          Right  LV FS       50.9 %    (>25%)   Ventricle:  (2D):                          RVd (Mmode):   2.35 cm  IVSd        1.03 cm  (0.7-1.1) RVd (2D):      3.32 cm  (Mmode):  LVPWd       1.03 cm  (0.7-1.1)  (Mmode):  LVIDd       4.61 cm  (3.4-5.7)  (Mmode):  LVIDs       3.29 cm  (Mmode):  LV FS       28.6 %    (>25%)  (Mmode):  Relative     0.39     (<0.42)  Wall  Thickness  Rel. Wall    0.45     (<0.42)  Thickness  Mm  LV Mass    83.5 g/m²  Index:  Mmode    SPECTRAL DOPPLER ANALYSIS:  LV DIASTOLIC FUNCTION:  MV Peak E: 0.60 m/s Decel Time: 266 msec  MV Peak A: 0.58 m/s  E/A Ratio: 1.05    Aortic Valve:  AoV VMax:    1.15 m/s AoV Area, Vmax: 3.81 cm² Vmax Indx: 1.90 cm²/m²  AoV Pk Grad: 5.3 mmHg    LVOT Vmax: 1.16 m/s  LVOT VTI:  0.22 m  LVOT Diam: 2.19 cm    Mitral Valve:  MV P1/2 Time: 77.00 msec  MV Area, PHT: 2.86 cm²    Tricuspid Valve and PA/RV Systolic Pressure: TR Max Velocity: 2.31 m/s RA   Pressure: 10 mmHg RVSP/PASP: 31.4 mmHg    Pulmonic Valve:  PV Max Velocity: 0.90 m/s PV Max PG: 3.3 mmHg PV Mean PG:       [id number] Frandy Ricketts M.D. Electronically signed on 3/7/201    LABS                        14.3   8.50  )-----------( 241      ( 08 Mar 2018 04:44 )             40.9     03-08    140  |  110  |  16  ----------------------------<  105  3.9   |  24  |  1.1    Ca    9.6      08 Mar 2018 04:44  Mg     2.1     03-08    TPro  7.0  /  Alb  4.6  /  TBili  0.8  /  DBili  x   /  AST  26  /  ALT  34  /  AlkPhos  47  03-06    CARDIAC MARKERS ( 08 Mar 2018 04:44 )  1.72 ng/mL / x     / 189 U/L / x     / 5.1 ng/mL  CARDIAC MARKERS ( 07 Mar 2018 07:03 )  1.88 ng/mL / x     / 269 U/L / x     / 10.8 ng/mL  CARDIAC MARKERS ( 06 Mar 2018 16:32 )  0.23 ng/mL / x     / 168 U/L / x     / 3.2 ng/mL  CARDIAC MARKERS ( 06 Mar 2018 12:56 )  0.07 ng/mL / x     / 150 U/L / x     / 1.4 ng/mL      Magnesium, Serum: 2.1 mg/dL [1.8 - 2.4] (03-08-18 @ 04:44)  LIVER FUNCTIONS - ( 06 Mar 2018 12:56 )  Alb: 4.6 g/dL / Pro: 7.0 g/dL / ALK PHOS: 47 U/L / ALT: 34 U/L / AST: 26 U/L / GGT: x             A/P:  I discussed the case with Interventional Cardiologist Dr. Ruiz   & recommends the following:    S/P PCI LIMA to LAD  	         Continue DAPT,  B-Blocker, Statin Therapy , pt reports slight SOB possibly due to brilinta, will review with Dr. Ruiz, will stop brilinta and start effient,, give loading dose of effient 60mg po x 1 now, then 10mg po daily starting 3/9                    oob-ch, ambulate                   Pt given instructions on importance of taking antiplatelet medication or risk acute stent thrombosis/death                   Post cath instructions, access site care and activity restrictions reviewed with patient                     Discussed with patient to return to hospital if experience chest pain, shortness breath, dizziness and site bleeding                   Aggressive risk factor modification, diet counseling, smoking cessation discussed with patient                       d/c pt home as per Dr. Ruiz                    Follow up with Cardiology Dr. Ruiz in one week after discharge,  Instructed to call and make an appointment     Agree with above

## 2018-03-08 NOTE — DISCHARGE NOTE ADULT - CARE PLAN
Principal Discharge DX:	Coronary artery disease  Goal:	optimal heart function  Assessment and plan of treatment:	take meds and follow up with cardiologist  Secondary Diagnosis:	Chest pain  Goal:	complete resolution  Assessment and plan of treatment:	s/p cardiac cath and stent  Secondary Diagnosis:	Hypertension, unspecified type  Goal:	optimal blood pressure control  Assessment and plan of treatment:	take meds and low salt diet  Secondary Diagnosis:	High cholesterol  Goal:	optimal blood cholesterol  Assessment and plan of treatment:	take meds and low fat diet

## 2018-03-08 NOTE — DISCHARGE NOTE ADULT - MEDICATION SUMMARY - MEDICATIONS TO TAKE
I will START or STAY ON the medications listed below when I get home from the hospital:    aspirin  -- 81 milligram(s) by mouth once a day  -- Indication: For Coronary artery disease    lisinopril 5 mg oral tablet  -- 1 tab(s) by mouth once a day  -- Indication: For Coronary artery disease    Lexapro  -- 10 milligram(s) by mouth once a day  -- Indication: For depression    atorvastatin 80 mg oral tablet  -- 1 tab(s) by mouth once a day  -- Indication: For Coronary artery disease    Effient 10 mg oral tablet  -- 1 tab(s) by mouth once a day MDD:1  -- It is very important that you take or use this exactly as directed.  Do not skip doses or discontinue unless directed by your doctor.  Obtain medical advice before taking any non-prescription drugs as some may affect the action of this medication.  Swallow whole.  Do not crush.    -- Indication: For Coronary artery disease    metoprolol  -- 25 milligram(s) by mouth 2 times a day  -- Indication: For Coronary artery disease    pantoprazole 40 mg oral delayed release tablet  -- 1 tab(s) by mouth once a day (before a meal)  -- Indication: For gastritis

## 2018-03-08 NOTE — DISCHARGE NOTE ADULT - PROVIDER TOKENS
TOKEN:'[id number]',FREE:[LAST:[Juan],FIRST:[Sonya],PHONE:[(   )    -],FAX:[(   )    -],ADDRESS:[known to patient]]

## 2018-03-08 NOTE — DISCHARGE NOTE ADULT - PATIENT PORTAL LINK FT
You can access the [facility], offered by [facility], by registering with the following website: [email or web address]

## 2018-03-12 DIAGNOSIS — I10 ESSENTIAL (PRIMARY) HYPERTENSION: ICD-10-CM

## 2018-03-12 DIAGNOSIS — Z87.891 PERSONAL HISTORY OF NICOTINE DEPENDENCE: ICD-10-CM

## 2018-03-12 DIAGNOSIS — I25.2 OLD MYOCARDIAL INFARCTION: ICD-10-CM

## 2018-03-12 DIAGNOSIS — I21.4 NON-ST ELEVATION (NSTEMI) MYOCARDIAL INFARCTION: ICD-10-CM

## 2018-03-12 DIAGNOSIS — R07.9 CHEST PAIN, UNSPECIFIED: ICD-10-CM

## 2018-03-12 DIAGNOSIS — I25.729 ATHEROSCLEROSIS OF AUTOLOGOUS ARTERY CORONARY ARTERY BYPASS GRAFT(S) WITH UNSPECIFIED ANGINA PECTORIS: ICD-10-CM

## 2018-03-12 DIAGNOSIS — F41.9 ANXIETY DISORDER, UNSPECIFIED: ICD-10-CM

## 2018-03-12 DIAGNOSIS — Z95.1 PRESENCE OF AORTOCORONARY BYPASS GRAFT: ICD-10-CM

## 2018-03-12 DIAGNOSIS — Z98.890 OTHER SPECIFIED POSTPROCEDURAL STATES: ICD-10-CM

## 2018-03-12 DIAGNOSIS — E78.5 HYPERLIPIDEMIA, UNSPECIFIED: ICD-10-CM

## 2018-03-19 DIAGNOSIS — I21.4 NON-ST ELEVATION (NSTEMI) MYOCARDIAL INFARCTION: ICD-10-CM

## 2019-02-08 ENCOUNTER — EMERGENCY (EMERGENCY)
Facility: HOSPITAL | Age: 49
LOS: 0 days | Discharge: HOME | End: 2019-02-08

## 2019-02-08 ENCOUNTER — INPATIENT (INPATIENT)
Facility: HOSPITAL | Age: 49
LOS: 4 days | Discharge: HOME | End: 2019-02-13
Attending: INTERNAL MEDICINE | Admitting: INTERNAL MEDICINE

## 2019-02-08 VITALS
DIASTOLIC BLOOD PRESSURE: 79 MMHG | HEART RATE: 75 BPM | RESPIRATION RATE: 18 BRPM | SYSTOLIC BLOOD PRESSURE: 131 MMHG | OXYGEN SATURATION: 98 % | TEMPERATURE: 98 F

## 2019-02-08 DIAGNOSIS — Z95.1 PRESENCE OF AORTOCORONARY BYPASS GRAFT: Chronic | ICD-10-CM

## 2019-02-08 DIAGNOSIS — Z98.890 OTHER SPECIFIED POSTPROCEDURAL STATES: Chronic | ICD-10-CM

## 2019-02-08 DIAGNOSIS — Z02.9 ENCOUNTER FOR ADMINISTRATIVE EXAMINATIONS, UNSPECIFIED: ICD-10-CM

## 2019-02-08 PROBLEM — I25.10 ATHEROSCLEROTIC HEART DISEASE OF NATIVE CORONARY ARTERY WITHOUT ANGINA PECTORIS: Chronic | Status: ACTIVE | Noted: 2018-03-06

## 2019-02-08 PROBLEM — E78.00 PURE HYPERCHOLESTEROLEMIA, UNSPECIFIED: Chronic | Status: ACTIVE | Noted: 2018-03-06

## 2019-02-08 PROBLEM — I10 ESSENTIAL (PRIMARY) HYPERTENSION: Chronic | Status: ACTIVE | Noted: 2018-03-06

## 2019-02-08 LAB
ALBUMIN SERPL ELPH-MCNC: 4.6 G/DL — SIGNIFICANT CHANGE UP (ref 3.5–5.2)
ALP SERPL-CCNC: 53 U/L — SIGNIFICANT CHANGE UP (ref 30–115)
ALT FLD-CCNC: 32 U/L — SIGNIFICANT CHANGE UP (ref 0–41)
ANION GAP SERPL CALC-SCNC: 13 MMOL/L — SIGNIFICANT CHANGE UP (ref 7–14)
APTT BLD: 31.8 SEC — SIGNIFICANT CHANGE UP (ref 27–39.2)
AST SERPL-CCNC: 24 U/L — SIGNIFICANT CHANGE UP (ref 0–41)
BASOPHILS # BLD AUTO: 0.02 K/UL — SIGNIFICANT CHANGE UP (ref 0–0.2)
BASOPHILS NFR BLD AUTO: 0.4 % — SIGNIFICANT CHANGE UP (ref 0–1)
BILIRUB SERPL-MCNC: <0.2 MG/DL — SIGNIFICANT CHANGE UP (ref 0.2–1.2)
BUN SERPL-MCNC: 12 MG/DL — SIGNIFICANT CHANGE UP (ref 10–20)
CALCIUM SERPL-MCNC: 9.5 MG/DL — SIGNIFICANT CHANGE UP (ref 8.5–10.1)
CHLORIDE SERPL-SCNC: 104 MMOL/L — SIGNIFICANT CHANGE UP (ref 98–110)
CK MB CFR SERPL CALC: 2.8 NG/ML — SIGNIFICANT CHANGE UP (ref 0.6–6.3)
CK SERPL-CCNC: 119 U/L — SIGNIFICANT CHANGE UP (ref 0–225)
CO2 SERPL-SCNC: 25 MMOL/L — SIGNIFICANT CHANGE UP (ref 17–32)
CREAT SERPL-MCNC: 0.9 MG/DL — SIGNIFICANT CHANGE UP (ref 0.7–1.5)
EOSINOPHIL # BLD AUTO: 0.22 K/UL — SIGNIFICANT CHANGE UP (ref 0–0.7)
EOSINOPHIL NFR BLD AUTO: 4.5 % — SIGNIFICANT CHANGE UP (ref 0–8)
GLUCOSE SERPL-MCNC: 88 MG/DL — SIGNIFICANT CHANGE UP (ref 70–99)
HCT VFR BLD CALC: 40.9 % — LOW (ref 42–52)
HGB BLD-MCNC: 14.1 G/DL — SIGNIFICANT CHANGE UP (ref 14–18)
IMM GRANULOCYTES NFR BLD AUTO: 0.6 % — HIGH (ref 0.1–0.3)
INR BLD: 0.93 RATIO — SIGNIFICANT CHANGE UP (ref 0.65–1.3)
LYMPHOCYTES # BLD AUTO: 1.42 K/UL — SIGNIFICANT CHANGE UP (ref 1.2–3.4)
LYMPHOCYTES # BLD AUTO: 29.3 % — SIGNIFICANT CHANGE UP (ref 20.5–51.1)
MCHC RBC-ENTMCNC: 30.7 PG — SIGNIFICANT CHANGE UP (ref 27–31)
MCHC RBC-ENTMCNC: 34.5 G/DL — SIGNIFICANT CHANGE UP (ref 32–37)
MCV RBC AUTO: 89.1 FL — SIGNIFICANT CHANGE UP (ref 80–94)
MONOCYTES # BLD AUTO: 0.5 K/UL — SIGNIFICANT CHANGE UP (ref 0.1–0.6)
MONOCYTES NFR BLD AUTO: 10.3 % — HIGH (ref 1.7–9.3)
NEUTROPHILS # BLD AUTO: 2.66 K/UL — SIGNIFICANT CHANGE UP (ref 1.4–6.5)
NEUTROPHILS NFR BLD AUTO: 54.9 % — SIGNIFICANT CHANGE UP (ref 42.2–75.2)
NRBC # BLD: 0 /100 WBCS — SIGNIFICANT CHANGE UP (ref 0–0)
PLATELET # BLD AUTO: 247 K/UL — SIGNIFICANT CHANGE UP (ref 130–400)
POTASSIUM SERPL-MCNC: 4.3 MMOL/L — SIGNIFICANT CHANGE UP (ref 3.5–5)
POTASSIUM SERPL-SCNC: 4.3 MMOL/L — SIGNIFICANT CHANGE UP (ref 3.5–5)
PROT SERPL-MCNC: 6.9 G/DL — SIGNIFICANT CHANGE UP (ref 6–8)
PROTHROM AB SERPL-ACNC: 10.7 SEC — SIGNIFICANT CHANGE UP (ref 9.95–12.87)
RBC # BLD: 4.59 M/UL — LOW (ref 4.7–6.1)
RBC # FLD: 11.8 % — SIGNIFICANT CHANGE UP (ref 11.5–14.5)
SODIUM SERPL-SCNC: 142 MMOL/L — SIGNIFICANT CHANGE UP (ref 135–146)
TROPONIN T SERPL-MCNC: 0.07 NG/ML — CRITICAL HIGH
TROPONIN T SERPL-MCNC: <0.01 NG/ML — SIGNIFICANT CHANGE UP
WBC # BLD: 4.85 K/UL — SIGNIFICANT CHANGE UP (ref 4.8–10.8)
WBC # FLD AUTO: 4.85 K/UL — SIGNIFICANT CHANGE UP (ref 4.8–10.8)

## 2019-02-08 RX ORDER — ATORVASTATIN CALCIUM 80 MG/1
40 TABLET, FILM COATED ORAL AT BEDTIME
Qty: 0 | Refills: 0 | Status: DISCONTINUED | OUTPATIENT
Start: 2019-02-08 | End: 2019-02-13

## 2019-02-08 RX ORDER — ENOXAPARIN SODIUM 100 MG/ML
40 INJECTION SUBCUTANEOUS EVERY 24 HOURS
Qty: 0 | Refills: 0 | Status: DISCONTINUED | OUTPATIENT
Start: 2019-02-08 | End: 2019-02-09

## 2019-02-08 RX ORDER — ASPIRIN/CALCIUM CARB/MAGNESIUM 324 MG
325 TABLET ORAL ONCE
Qty: 0 | Refills: 0 | Status: COMPLETED | OUTPATIENT
Start: 2019-02-08 | End: 2019-02-08

## 2019-02-08 RX ORDER — ESCITALOPRAM OXALATE 10 MG/1
10 TABLET, FILM COATED ORAL DAILY
Qty: 0 | Refills: 0 | Status: DISCONTINUED | OUTPATIENT
Start: 2019-02-08 | End: 2019-02-13

## 2019-02-08 RX ORDER — LISINOPRIL 2.5 MG/1
5 TABLET ORAL DAILY
Qty: 0 | Refills: 0 | Status: DISCONTINUED | OUTPATIENT
Start: 2019-02-08 | End: 2019-02-13

## 2019-02-08 RX ORDER — ASPIRIN/CALCIUM CARB/MAGNESIUM 324 MG
81 TABLET ORAL DAILY
Qty: 0 | Refills: 0 | Status: DISCONTINUED | OUTPATIENT
Start: 2019-02-08 | End: 2019-02-13

## 2019-02-08 RX ORDER — PRASUGREL 5 MG/1
10 TABLET, FILM COATED ORAL DAILY
Qty: 0 | Refills: 0 | Status: DISCONTINUED | OUTPATIENT
Start: 2019-02-08 | End: 2019-02-09

## 2019-02-08 RX ORDER — METOPROLOL TARTRATE 50 MG
25 TABLET ORAL
Qty: 0 | Refills: 0 | Status: DISCONTINUED | OUTPATIENT
Start: 2019-02-08 | End: 2019-02-13

## 2019-02-08 RX ADMIN — ATORVASTATIN CALCIUM 40 MILLIGRAM(S): 80 TABLET, FILM COATED ORAL at 23:25

## 2019-02-08 RX ADMIN — Medication 325 MILLIGRAM(S): at 14:14

## 2019-02-08 NOTE — H&P ADULT - NSHPPHYSICALEXAM_GEN_ALL_CORE
T(C): 35.7 (02-08-19 @ 16:13), Max: 36.6 (02-08-19 @ 12:32)  HR: 67 (02-08-19 @ 16:13) (67 - 75)  BP: 147/85 (02-08-19 @ 16:13) (131/79 - 147/85)  RR: 18 (02-08-19 @ 16:13) (18 - 18)  SpO2: 99% (02-08-19 @ 16:13) (98% - 99%)    CONSTITUTIONAL: Well-developed; well-nourished; in no acute distress.  HEAD: Normocephalic; atraumatic.  NECK: Supple; non tender.   CARD: S1, S2 normal;  Regular rate and rhythm.  RESP: No wheezes, rales or rhonchi.  ABD:  soft; non-distended; non-tender;   EXT: Normal ROM. No clubbing, cyanosis or edema.  NEURO: Alert, oriented. Grossly unremarkable. No focal deficits.  PSYCH: Cooperative, appropriate. T(C): 35.7 (02-08-19 @ 16:13), Max: 36.6 (02-08-19 @ 12:32)  HR: 67 (02-08-19 @ 16:13) (67 - 75)  BP: 147/85 (02-08-19 @ 16:13) (131/79 - 147/85)  RR: 18 (02-08-19 @ 16:13) (18 - 18)  SpO2: 99% (02-08-19 @ 16:13) (98% - 99%)    CONSTITUTIONAL: Well-developed; well-nourished; in no acute distress.  HEAD: Normocephalic; atraumatic.  NECK: Supple; non tender.   CV: S1, S2 normal;  Regular rate and rhythm.  Pulm: No wheezes, rales or rhonchi.  GI:  soft; non-distended; non-tender;   EXT: Normal ROM. No clubbing, cyanosis or edema.  NEURO: Alert, oriented. Grossly unremarkable. No focal deficits.  PSYCH: Cooperative, appropriate.

## 2019-02-08 NOTE — H&P ADULT - HISTORY OF PRESENT ILLNESS
48 y m with pmh of htn, depression, cad s/p cabg and stent in march, 2018p/w chest pain since morning which he describes as dull, pressure like radiating to back and shoulders, 5/10 now 1/10, intermittent, along with sweats but no lightheadedness, n/v, palpitations , leg swelling, fever ,chills. he mentions that pain is similar to last time he had stent placed but less intensity. he had  exercise stress test negative in may

## 2019-02-08 NOTE — H&P ADULT - NSHPLABSRESULTS_GEN_ALL_CORE
14.1   4.85  )-----------( 247      ( 08 Feb 2019 13:35 )             40.9       02-08    142  |  104  |  12  ----------------------------<  88  4.3   |  25  |  0.9    Ca    9.5      08 Feb 2019 13:35    TPro  6.9  /  Alb  4.6  /  TBili  <0.2  /  DBili  x   /  AST  24  /  ALT  32  /  AlkPhos  53  02-08                  PT/INR - ( 08 Feb 2019 13:35 )   PT: 10.70 sec;   INR: 0.93 ratio         PTT - ( 08 Feb 2019 13:35 )  PTT:31.8 sec    Lactate Trend      CARDIAC MARKERS ( 08 Feb 2019 13:35 )  x     / <0.01 ng/mL / x     / x     / x            CAPILLARY BLOOD GLUCOSE        < from: 12 Lead ECG (02.08.19 @ 12:36) >    Normal sinus rhythm  Normal ECG    < end of copied text >    < from: Transthoracic Echocardiogram (03.07.18 @ 12:47) >     1. Normal global left ventricular systolic function.   2. LV Ejection Fraction by Coon's Method with a biplane EF of 56 %.    < end of copied text >    < from: Xray Chest 2 Views PA/Lat (02.08.19 @ 14:26) >    No lobar consolidation,effusion or pneumothorax.    < end of copied text > 14.1   4.85  )-----------( 247      ( 08 Feb 2019 13:35 )             40.9       02-08    142  |  104  |  12  ----------------------------<  88  4.3   |  25  |  0.9    Ca    9.5      08 Feb 2019 13:35    TPro  6.9  /  Alb  4.6  /  TBili  <0.2  /  DBili  x   /  AST  24  /  ALT  32  /  AlkPhos  53  02-08                  PT/INR - ( 08 Feb 2019 13:35 )   PT: 10.70 sec;   INR: 0.93 ratio         PTT - ( 08 Feb 2019 13:35 )  PTT:31.8 sec    Lactate Trend      CARDIAC MARKERS ( 08 Feb 2019 13:35 )  x     / <0.01 ng/mL / x     / x     / x            CAPILLARY BLOOD GLUCOSE          EKG which I reviewed shows   Normal sinus rhythm  Normal ECG      < from: Transthoracic Echocardiogram (03.07.18 @ 12:47) >     1. Normal global left ventricular systolic function.   2. LV Ejection Fraction by Coon's Method with a biplane EF of 56 %.      Chest xray which I reviewed shows   No lobar consolidation,effusion or pneumothorax.

## 2019-02-08 NOTE — H&P ADULT - ASSESSMENT
48 ym with pmh of htn, cad s/p cabg and stents p/w chest pain    1) chest pain - r/o acs  repeat ce x2  check ekg in am  check echo  c/w asa, effient, lipitor, metoprolol  consider cardio eval  consider stress test if ce are neg    2) htn- c/w lisinopril    3) depression- c/w lexapro    4) dvt ppx  diet- dash  dispo- home 48 ym with pmh of htn, cad s/p cabg and stents p/w chest pain    1) chest pain and positive troponin will treat as NSTEMI patient with history of CAD/CABG/PCI   check echo  c/w asa, effient, lipitor, metoprolol  start hep gtt   cardio eval   consider stress test if ce are neg    2) htn- c/w lisinopril    3) depression- c/w lexapro    4) dvt ppx  diet- dash  dispo- home 48 ym with pmh of htn, cad s/p cabg and stents p/w chest pain    1) chest pain and positive troponin will treat as NSTEMI patient with history of CAD/CABG/PCI   check echo  c/w asa, plavix, patient is not on effient stop and resume plavix,  lipitor, metoprolol  start hep gtt   cardio eval   consider stress test if ce are neg    2) htn- c/w lisinopril    3) depression- c/w lexapro    4) dvt ppx  diet- dash  dispo- home

## 2019-02-08 NOTE — ED PROVIDER NOTE - PHYSICAL EXAMINATION
CONSTITUTIONAL: Well-developed; well-nourished; in no acute distress. Sitting up and providing appropriate history and physical examination  SKIN: skin exam is warm and dry, no acute rash.  HEAD: Normocephalic; atraumatic.  EYES: PERRL, 3 mm bilateral, no nystagmus, EOM intact; conjunctiva and sclera clear.  ENT: No nasal discharge; airway clear.  NECK: Supple; non tender. + full passive ROM in all directions. No JVD  CARD: S1, S2 normal; no murmurs, gallops, or rubs. Regular rate and rhythm. + Symmetric Strong Pulses  RESP: No wheezes, rales or rhonchi. Good air movement bilaterally  ABD: soft; non-distended; non-tender. No Rebound, No Guarding, No signs of peritonitis, No CVA tenderness. No pulsatile abdominal mass. + Strong and Symmetric Pulses  EXT: Normal ROM. No clubbing, cyanosis or edema. Dp and Pt Pulses intact. Cap refill less than 3 seconds  NEURO: CN 2-12 intact, normal finger to nose, normal romberg, stable gait, no sensory or motor deficits, Alert, oriented, grossly unremarkable. No Focal deficits. GCS 15. NIH 0  PSYCH: Cooperative, appropriate.

## 2019-02-08 NOTE — ED PROVIDER NOTE - OBJECTIVE STATEMENT
48 year old male, mhx of cardiac stents and cabg, patient cardiologist is Dr. Zaragoza, come sin with complaint of left side chest pain, dull, non radiating, not associated with n/v/d, no sob, no hemoptysis, no fever, no recent travel no loc, no alleviating factors

## 2019-02-08 NOTE — ED PROVIDER NOTE - MEDICAL DECISION MAKING DETAILS
I have personally performed a history and physical exam on this patient and personally directed the management of the patient. Patient ekg displyas nsr, no chetna. Patient cxr: napd. Patient labs reviewed. Patient will be admitted, discussed with mar and hospitalist. Stable upon admission

## 2019-02-09 LAB
ANION GAP SERPL CALC-SCNC: 13 MMOL/L — SIGNIFICANT CHANGE UP (ref 7–14)
BUN SERPL-MCNC: 14 MG/DL — SIGNIFICANT CHANGE UP (ref 10–20)
CALCIUM SERPL-MCNC: 9.6 MG/DL — SIGNIFICANT CHANGE UP (ref 8.5–10.1)
CHLORIDE SERPL-SCNC: 102 MMOL/L — SIGNIFICANT CHANGE UP (ref 98–110)
CHOLEST SERPL-MCNC: 204 MG/DL — HIGH (ref 100–200)
CK MB CFR SERPL CALC: 3.1 NG/ML — SIGNIFICANT CHANGE UP (ref 0.6–6.3)
CK MB CFR SERPL CALC: 3.5 NG/ML — SIGNIFICANT CHANGE UP (ref 0.6–6.3)
CK MB CFR SERPL CALC: 3.7 NG/ML — SIGNIFICANT CHANGE UP (ref 0.6–6.3)
CK SERPL-CCNC: 124 U/L — SIGNIFICANT CHANGE UP (ref 0–225)
CK SERPL-CCNC: 126 U/L — SIGNIFICANT CHANGE UP (ref 0–225)
CK SERPL-CCNC: 130 U/L — SIGNIFICANT CHANGE UP (ref 0–225)
CO2 SERPL-SCNC: 25 MMOL/L — SIGNIFICANT CHANGE UP (ref 17–32)
CREAT SERPL-MCNC: 0.9 MG/DL — SIGNIFICANT CHANGE UP (ref 0.7–1.5)
ESTIMATED AVERAGE GLUCOSE: 114 MG/DL — SIGNIFICANT CHANGE UP (ref 68–114)
GLUCOSE SERPL-MCNC: 84 MG/DL — SIGNIFICANT CHANGE UP (ref 70–99)
HBA1C BLD-MCNC: 5.6 % — SIGNIFICANT CHANGE UP (ref 4–5.6)
HDLC SERPL-MCNC: 53 MG/DL — SIGNIFICANT CHANGE UP
LIPID PNL WITH DIRECT LDL SERPL: 151 MG/DL — HIGH (ref 4–129)
POTASSIUM SERPL-MCNC: 4 MMOL/L — SIGNIFICANT CHANGE UP (ref 3.5–5)
POTASSIUM SERPL-SCNC: 4 MMOL/L — SIGNIFICANT CHANGE UP (ref 3.5–5)
SODIUM SERPL-SCNC: 140 MMOL/L — SIGNIFICANT CHANGE UP (ref 135–146)
TOTAL CHOLESTEROL/HDL RATIO MEASUREMENT: 3.8 RATIO — LOW (ref 4–5.5)
TRIGL SERPL-MCNC: 158 MG/DL — HIGH (ref 10–149)
TROPONIN T SERPL-MCNC: 0.06 NG/ML — CRITICAL HIGH
TROPONIN T SERPL-MCNC: 0.13 NG/ML — CRITICAL HIGH
TROPONIN T SERPL-MCNC: 0.15 NG/ML — CRITICAL HIGH

## 2019-02-09 RX ORDER — CLOPIDOGREL BISULFATE 75 MG/1
75 TABLET, FILM COATED ORAL ONCE
Qty: 0 | Refills: 0 | Status: COMPLETED | OUTPATIENT
Start: 2019-02-09 | End: 2019-02-09

## 2019-02-09 RX ORDER — CLOPIDOGREL BISULFATE 75 MG/1
75 TABLET, FILM COATED ORAL DAILY
Qty: 0 | Refills: 0 | Status: DISCONTINUED | OUTPATIENT
Start: 2019-02-10 | End: 2019-02-13

## 2019-02-09 RX ORDER — HEPARIN SODIUM 5000 [USP'U]/ML
1000 INJECTION INTRAVENOUS; SUBCUTANEOUS
Qty: 25000 | Refills: 0 | Status: DISCONTINUED | OUTPATIENT
Start: 2019-02-09 | End: 2019-02-11

## 2019-02-09 RX ORDER — ACETAMINOPHEN 500 MG
650 TABLET ORAL ONCE
Qty: 0 | Refills: 0 | Status: COMPLETED | OUTPATIENT
Start: 2019-02-09 | End: 2019-02-09

## 2019-02-09 RX ADMIN — ESCITALOPRAM OXALATE 10 MILLIGRAM(S): 10 TABLET, FILM COATED ORAL at 12:06

## 2019-02-09 RX ADMIN — ATORVASTATIN CALCIUM 40 MILLIGRAM(S): 80 TABLET, FILM COATED ORAL at 22:31

## 2019-02-09 RX ADMIN — Medication 25 MILLIGRAM(S): at 06:55

## 2019-02-09 RX ADMIN — Medication 650 MILLIGRAM(S): at 12:05

## 2019-02-09 RX ADMIN — Medication 25 MILLIGRAM(S): at 17:36

## 2019-02-09 RX ADMIN — Medication 650 MILLIGRAM(S): at 12:08

## 2019-02-09 RX ADMIN — HEPARIN SODIUM 10 UNIT(S)/HR: 5000 INJECTION INTRAVENOUS; SUBCUTANEOUS at 17:35

## 2019-02-09 RX ADMIN — LISINOPRIL 5 MILLIGRAM(S): 2.5 TABLET ORAL at 06:55

## 2019-02-09 RX ADMIN — CLOPIDOGREL BISULFATE 75 MILLIGRAM(S): 75 TABLET, FILM COATED ORAL at 12:06

## 2019-02-09 RX ADMIN — Medication 81 MILLIGRAM(S): at 12:06

## 2019-02-09 RX ADMIN — ENOXAPARIN SODIUM 40 MILLIGRAM(S): 100 INJECTION SUBCUTANEOUS at 06:54

## 2019-02-09 NOTE — PROGRESS NOTE ADULT - ASSESSMENT
48 ym with pmh of htn, cad s/p cabg ( 4 years ago) and stents ( march 2018- 3 stents) p/w chest pain. Follows with Dr rodriguez.     1) chest pain ( ACS? vs Atypical chest pain- unlikely)  - as per pt, similar in nature as last time, when pt had 3 stents placed in march 2018.   - trop trend- negative> 0.07 > 0.15> 0.13.   - TTE- pending.   - c/w asa, effient, lipitor, metoprolol, statin.   - cardiology evaluation.      2) h/o HTN- controlled.   c/w lisinopril.     3) h/o depression  - c/w lexapro    4) dvt ppx- lovenox  diet- dash  dispo- home  Full code  activity- as tolerated. 48 ym with pmh of htn, cad s/p cabg ( 4 years ago) and stents ( march 2018- 3 stents) p/w chest pain. Follows with Dr rodriguez.     1) chest pain ( ACS? vs Atypical chest pain- unlikely)  - as per pt, similar in nature as last time, when pt had 3 stents placed in march 2018.   - trop trend- negative> 0.07 > 0.15> 0.13.   - TTE- pending.   - lipid panel- high cholesterol, normal HDL.   - c/w asa, effient, lipitor, metoprolol, statin.   - cardiology evaluation.      2) h/o HTN- controlled.   c/w lisinopril.     3) h/o depression  - c/w lexapro    4) dvt ppx- lovenox  diet- dash  dispo- home  Full code  activity- as tolerated. 48 ym with pmh of htn, cad s/p cabg ( 4 years ago) and stents ( march 2018- 3 stents) p/w chest pain. Follows with Dr ogden.     1) chest pain ( ACS? vs Atypical chest pain- unlikely)  - as per pt, similar in nature as last time, when pt had 3 stents placed in march 2018.   - trop trend- negative> 0.07 > 0.15> 0.13.   - TTE- pending.   - start on heparin drip.   - lipid panel- high cholesterol, normal HDL.   - c/w asa, effient, lipitor, metoprolol, statin.   - cardiology evaluation. ( f/u with Dr Ogden)      2) h/o HTN- controlled.   c/w lisinopril.     3) h/o depression  - c/w lexapro    4) dvt ppx- on heparin.   diet- dash  dispo- home  Full code  activity- as tolerated. 48 ym with pmh of htn, cad s/p cabg ( 4 years ago) and stents ( march 2018- 3 stents) p/w chest pain. Follows with Dr ogden.     1) chest pain ( ACS? vs Atypical chest pain- unlikely)  - as per pt, similar in nature as last time, when pt had 3 stents placed in march 2018.   - trop trend- negative> 0.07 > 0.15> 0.13.   - TTE- pending.   - start on heparin drip.   - lipid panel- high cholesterol, normal HDL.   - c/w asa, lipitor, metoprolol, statin, plavix  - OF NOTE- Pt doesnt take prasugrel as mentioned in outpt meds, Pt takes plavix.   - cardiology evaluation. ( f/u with Dr Ogden)      2) h/o HTN- controlled.   c/w lisinopril.     3) h/o depression  - c/w lexapro    4) dvt ppx- on heparin.   diet- dash  dispo- home  Full code  activity- as tolerated. 48 ym with pmh of htn, cad s/p cabg ( 4 years ago) and stents ( march 2018- 3 stents) p/w chest pain. Follows with Dr ogden.     1) chest pain ( ACS? vs Atypical chest pain- unlikely)  - as per pt, similar in nature as last time, when pt had 3 stents placed in march 2018.   - trop trend- negative> 0.07 > 0.15> 0.13.   - TTE- pending.   - start on heparin drip. ptt q6hr.   - lipid panel- high cholesterol, normal HDL.   - c/w asa, lipitor, metoprolol, statin, plavix  - OF NOTE- Pt doesnt take prasugrel as mentioned in outpt meds, Pt takes plavix.   - cardiology evaluation. ( f/u with Dr Ogden)- cath monday, npo after sunday night.       2) h/o HTN- controlled.   c/w lisinopril.     3) h/o depression  - c/w lexapro    4) dvt ppx- on heparin.   diet- dash  dispo- home  Full code  activity- as tolerated.

## 2019-02-09 NOTE — CONSULT NOTE ADULT - SUBJECTIVE AND OBJECTIVE BOX
Patient is a 48y old  Male who presents with a chief complaint of chest pain (09 Feb 2019 09:42)      HPI:  48 y m with pmh of htn, depression, cad s/p cabg and stent in march, 2018p/w chest pain since morning which he describes as dull, pressure like radiating to back and shoulders, 5/10 now 1/10, intermittent, along with sweats but no lightheadedness, n/v, palpitations , leg swelling, fever ,chills. he mentions that pain is similar to last time he had stent placed but less intensity. he had  exercise stress test negative in may (08 Feb 2019 19:37)      PAST MEDICAL & SURGICAL HISTORY:  Coronary artery disease  High cholesterol  Hypertension, unspecified type  History of quadruple bypass  History of appendectomy      PREVIOUS DIAGNOSTIC TESTING:      ECHO  FINDINGS:    STRESS  FINDINGS:    CATHETERIZATION  FINDINGS:    MEDICATIONS  (STANDING):  aspirin  chewable 81 milliGRAM(s) Oral daily  atorvastatin 40 milliGRAM(s) Oral at bedtime  escitalopram 10 milliGRAM(s) Oral daily  lisinopril 5 milliGRAM(s) Oral daily  metoprolol tartrate 25 milliGRAM(s) Oral two times a day    MEDICATIONS  (PRN):      FAMILY HISTORY:  Family history of coronary artery disease in brother (Father, Sibling)      SOCIAL HISTORY:  CIGARETTES:    ALCOHOL:    REVIEW OF SYSTEMS:  CONSTITUTIONAL: No fever, weight loss, or fatigue  NECK: No pain or stiffness  RESPIRATORY: No cough, wheezing, chills or hemoptysis; No shortness of breath  CARDIOVASCULAR: No chest pain, palpitations, dizziness, or leg swelling  GASTROINTESTINAL: No abdominal or epigastric pain. No nausea, vomiting, or hematemesis; No diarrhea or constipation. No melena or hematochezia.  GENITOURINARY: No dysuria, frequency, hematuria, or incontinence  NEUROLOGICAL: No headaches, memory loss, loss of strength, numbness, or tremors  SKIN: No itching, burning, rashes, or lesions   ENDOCRINE: No heat or cold intolerance; No hair loss  MUSCULOSKELETAL: No joint pain or swelling; No muscle, back, or extremity pain  HEME/LYMPH: No easy bruising, or bleeding gums          Vital Signs Last 24 Hrs  T(C): 36.3 (09 Feb 2019 13:22), Max: 36.8 (08 Feb 2019 23:26)  T(F): 97.3 (09 Feb 2019 13:22), Max: 98.2 (08 Feb 2019 23:26)  HR: 71 (09 Feb 2019 13:22) (62 - 72)  BP: 119/75 (09 Feb 2019 13:22) (116/78 - 147/85)  BP(mean): --  RR: 18 (09 Feb 2019 13:22) (18 - 18)  SpO2: 98% (08 Feb 2019 23:26) (98% - 99%)        PHYSICAL EXAM:  GENERAL: NAD, well-groomed, well-developed  HEAD:  Atraumatic, Normocephalic  NECK: Supple, No JVD, Normal thyroid  NERVOUS SYSTEM:  Alert & Oriented X3, Good concentration  CHEST/LUNG: Clear to percussion bilaterally; No rales, rhonchi, wheezing, or rubs  HEART: Regular rate and rhythm; No murmurs, rubs, or gallops  ABDOMEN: Soft, Nontender, Nondistended; Bowel sounds present  EXTREMITIES:  2+ Peripheral Pulses, No clubbing, cyanosis, or edema  SKIN: No rashes or lesions    INTERPRETATION OF TELEMETRY:    ECG:    I&O's Detail      LABS:                        14.1   4.85  )-----------( 247      ( 08 Feb 2019 13:35 )             40.9     02-09    140  |  102  |  14  ----------------------------<  84  4.0   |  25  |  0.9    Ca    9.6      09 Feb 2019 04:30    TPro  6.9  /  Alb  4.6  /  TBili  <0.2  /  DBili  x   /  AST  24  /  ALT  32  /  AlkPhos  53  02-08    CARDIAC MARKERS ( 09 Feb 2019 11:55 )  x     / x     / 126 U/L / x     / 3.1 ng/mL  CARDIAC MARKERS ( 09 Feb 2019 04:30 )  x     / 0.13 ng/mL / 124 U/L / x     / 3.7 ng/mL  CARDIAC MARKERS ( 09 Feb 2019 01:10 )  x     / 0.15 ng/mL / 130 U/L / x     / 3.5 ng/mL  CARDIAC MARKERS ( 08 Feb 2019 22:26 )  x     / 0.07 ng/mL / 119 U/L / x     / 2.8 ng/mL  CARDIAC MARKERS ( 08 Feb 2019 13:35 )  x     / <0.01 ng/mL / x     / x     / x          PT/INR - ( 08 Feb 2019 13:35 )   PT: 10.70 sec;   INR: 0.93 ratio         PTT - ( 08 Feb 2019 13:35 )  PTT:31.8 sec    I&O's Summary      RADIOLOGY & ADDITIONAL STUDIES:

## 2019-02-09 NOTE — PROGRESS NOTE ADULT - SUBJECTIVE AND OBJECTIVE BOX
SUBJECTIVE:    Patient is a 48y old Male who presents with a chief complaint of chest pain (08 Feb 2019 19:37)    Currently admitted to medicine with the primary diagnosis of Chest pain     Today is hospital day 1d. This morning he is resting comfortably in bed and reports no new issues or overnight events.     Pt came in with chest pain. states that the pain started 9am yesterday and lasted till 5 pm. it was retrosternal. Pt states the pain felt like last time in march when he went to his cardiologist and had 3 stents placed. Pt had CABG 4 years ago.     PAST MEDICAL & SURGICAL HISTORY  Coronary artery disease  High cholesterol  Hypertension, unspecified type  History of quadruple bypass  History of appendectomy    SOCIAL HISTORY:  Negative for smoking/alcohol/drug use.     ALLERGIES:  No Known Allergies    MEDICATIONS:  STANDING MEDICATIONS  aspirin  chewable 81 milliGRAM(s) Oral daily  atorvastatin 40 milliGRAM(s) Oral at bedtime  enoxaparin Injectable 40 milliGRAM(s) SubCutaneous every 24 hours  escitalopram 10 milliGRAM(s) Oral daily  lisinopril 5 milliGRAM(s) Oral daily  metoprolol tartrate 25 milliGRAM(s) Oral two times a day  prasugrel 10 milliGRAM(s) Oral daily    PRN MEDICATIONS    VITALS:   T(F): 98.2  HR: 62  BP: 116/78  RR: 18  SpO2: 98%    LABS:                        14.1   4.85  )-----------( 247      ( 08 Feb 2019 13:35 )             40.9     02-09    140  |  102  |  14  ----------------------------<  84  4.0   |  25  |  0.9    Ca    9.6      09 Feb 2019 04:30    TPro  6.9  /  Alb  4.6  /  TBili  <0.2  /  DBili  x   /  AST  24  /  ALT  32  /  AlkPhos  53  02-08    PT/INR - ( 08 Feb 2019 13:35 )   PT: 10.70 sec;   INR: 0.93 ratio         PTT - ( 08 Feb 2019 13:35 )  PTT:31.8 sec      Creatine Kinase, Serum: 124 U/L (02-09-19 @ 04:30)  Troponin T, Serum: 0.13 ng/mL <HH> (02-09-19 @ 04:30)  Creatine Kinase, Serum: 130 U/L (02-09-19 @ 01:10)  Troponin T, Serum: 0.15 ng/mL <HH> (02-09-19 @ 01:10)  Creatine Kinase, Serum: 119 U/L (02-08-19 @ 22:26)  Troponin T, Serum: 0.07 ng/mL <HH> (02-08-19 @ 22:26)  Troponin T, Serum: <0.01 ng/mL (02-08-19 @ 13:35)      CARDIAC MARKERS ( 09 Feb 2019 04:30 )  x     / 0.13 ng/mL / 124 U/L / x     / 3.7 ng/mL  CARDIAC MARKERS ( 09 Feb 2019 01:10 )  x     / 0.15 ng/mL / 130 U/L / x     / 3.5 ng/mL  CARDIAC MARKERS ( 08 Feb 2019 22:26 )  x     / 0.07 ng/mL / 119 U/L / x     / 2.8 ng/mL  CARDIAC MARKERS ( 08 Feb 2019 13:35 )  x     / <0.01 ng/mL / x     / x     / x          RADIOLOGY:  < from: Xray Chest 2 Views PA/Lat (02.08.19 @ 14:26) >  Impression:      No lobar consolidation,effusion or pneumothorax.    < end of copied text >    < from: 12 Lead ECG (02.08.19 @ 12:36) >  Diagnosis Line Normal sinus rhythm  Normal ECG    < end of copied text >    PHYSICAL EXAM:  GEN: No acute distress  LUNGS: Clear to auscultation bilaterally   HEART: S1/S2 present. RRR.   ABD: Soft, non-tender, non-distended. Bowel sounds present  EXT: NC/NC/NE/2+PP/BISHOP  NEURO: AAOX3

## 2019-02-10 LAB
APTT BLD: 38.8 SEC — SIGNIFICANT CHANGE UP (ref 27–39.2)
APTT BLD: 44.1 SEC — HIGH (ref 27–39.2)
APTT BLD: 47.8 SEC — HIGH (ref 27–39.2)
APTT BLD: 49.3 SEC — HIGH (ref 27–39.2)
APTT BLD: 58.8 SEC — HIGH (ref 27–39.2)

## 2019-02-10 RX ADMIN — LISINOPRIL 5 MILLIGRAM(S): 2.5 TABLET ORAL at 06:02

## 2019-02-10 RX ADMIN — HEPARIN SODIUM 12 UNIT(S)/HR: 5000 INJECTION INTRAVENOUS; SUBCUTANEOUS at 04:40

## 2019-02-10 RX ADMIN — Medication 81 MILLIGRAM(S): at 11:15

## 2019-02-10 RX ADMIN — ESCITALOPRAM OXALATE 10 MILLIGRAM(S): 10 TABLET, FILM COATED ORAL at 11:15

## 2019-02-10 RX ADMIN — HEPARIN SODIUM 14 UNIT(S)/HR: 5000 INJECTION INTRAVENOUS; SUBCUTANEOUS at 20:09

## 2019-02-10 RX ADMIN — Medication 25 MILLIGRAM(S): at 06:02

## 2019-02-10 RX ADMIN — CLOPIDOGREL BISULFATE 75 MILLIGRAM(S): 75 TABLET, FILM COATED ORAL at 11:15

## 2019-02-10 RX ADMIN — ATORVASTATIN CALCIUM 40 MILLIGRAM(S): 80 TABLET, FILM COATED ORAL at 21:47

## 2019-02-10 RX ADMIN — Medication 25 MILLIGRAM(S): at 17:22

## 2019-02-10 RX ADMIN — HEPARIN SODIUM 12 UNIT(S)/HR: 5000 INJECTION INTRAVENOUS; SUBCUTANEOUS at 18:59

## 2019-02-10 NOTE — PROGRESS NOTE ADULT - ASSESSMENT
1)NSTEMI :   hep gtt stop at 4am tomorrow monitor ptt   Cath tomorrow , c/w asa , plavix   NPO after MN        2) htn- c/w lisinopril    3) depression- c/w lexapro    4) dvt ppx  diet- dash  dispo- home

## 2019-02-10 NOTE — PROGRESS NOTE ADULT - SUBJECTIVE AND OBJECTIVE BOX
no chest pain and no sob     Vital Signs Last 24 Hrs  T(C): 35.4 (10 Feb 2019 05:35), Max: 36.3 (09 Feb 2019 13:22)  T(F): 95.8 (10 Feb 2019 05:35), Max: 97.3 (09 Feb 2019 13:22)  HR: 67 (10 Feb 2019 06:01) (60 - 71)  BP: 111/65 (10 Feb 2019 06:01) (108/48 - 131/88)  BP(mean): --  RR: 18 (09 Feb 2019 13:22) (18 - 18)  SpO2: 99% (09 Feb 2019 20:11) (99% - 99%)    PHYSICAL EXAM:  GENERAL: NAD, well-developed  HEAD:  Atraumatic, Normocephalic  EYES: EOMI, PERRLA, conjunctiva and sclera clear  NECK: Supple, No JVD  Pulm: Clear to auscultation bilaterally; No wheeze  CV: Regular rate and rhythm; No murmurs, rubs, or gallops  GI: Soft, Nontender, Nondistended; Bowel sounds present  EXTREMITIES:  2+ Peripheral Pulses, No clubbing, cyanosis, or edema  PSYCH: AAOx3  NEUROLOGY: non-focal  SKIN: No rashes or lesions                          14.1   4.85  )-----------( 247      ( 08 Feb 2019 13:35 )             40.9     02-09    140  |  102  |  14  ----------------------------<  84  4.0   |  25  |  0.9    Ca    9.6      09 Feb 2019 04:30    TPro  6.9  /  Alb  4.6  /  TBili  <0.2  /  DBili  x   /  AST  24  /  ALT  32  /  AlkPhos  53  02-08    LIVER FUNCTIONS - ( 08 Feb 2019 13:35 )  Alb: 4.6 g/dL / Pro: 6.9 g/dL / ALK PHOS: 53 U/L / ALT: 32 U/L / AST: 24 U/L / GGT: x           PT/INR - ( 08 Feb 2019 13:35 )   PT: 10.70 sec;   INR: 0.93 ratio         PTT - ( 10 Feb 2019 05:05 )  PTT:44.1 sec  CARDIAC MARKERS ( 09 Feb 2019 11:55 )  x     / 0.06 ng/mL / 126 U/L / x     / 3.1 ng/mL  CARDIAC MARKERS ( 09 Feb 2019 04:30 )  x     / 0.13 ng/mL / 124 U/L / x     / 3.7 ng/mL  CARDIAC MARKERS ( 09 Feb 2019 01:10 )  x     / 0.15 ng/mL / 130 U/L / x     / 3.5 ng/mL  CARDIAC MARKERS ( 08 Feb 2019 22:26 )  x     / 0.07 ng/mL / 119 U/L / x     / 2.8 ng/mL  CARDIAC MARKERS ( 08 Feb 2019 13:35 )  x     / <0.01 ng/mL / x     / x     / x

## 2019-02-11 LAB
ANION GAP SERPL CALC-SCNC: 15 MMOL/L — HIGH (ref 7–14)
APTT BLD: 30.8 SEC — SIGNIFICANT CHANGE UP (ref 27–39.2)
APTT BLD: 57.5 SEC — HIGH (ref 27–39.2)
BUN SERPL-MCNC: 18 MG/DL — SIGNIFICANT CHANGE UP (ref 10–20)
CALCIUM SERPL-MCNC: 9.2 MG/DL — SIGNIFICANT CHANGE UP (ref 8.5–10.1)
CHLORIDE SERPL-SCNC: 101 MMOL/L — SIGNIFICANT CHANGE UP (ref 98–110)
CO2 SERPL-SCNC: 25 MMOL/L — SIGNIFICANT CHANGE UP (ref 17–32)
CREAT SERPL-MCNC: 1.1 MG/DL — SIGNIFICANT CHANGE UP (ref 0.7–1.5)
GLUCOSE SERPL-MCNC: 92 MG/DL — SIGNIFICANT CHANGE UP (ref 70–99)
POTASSIUM SERPL-MCNC: 4.5 MMOL/L — SIGNIFICANT CHANGE UP (ref 3.5–5)
POTASSIUM SERPL-SCNC: 4.5 MMOL/L — SIGNIFICANT CHANGE UP (ref 3.5–5)
SODIUM SERPL-SCNC: 141 MMOL/L — SIGNIFICANT CHANGE UP (ref 135–146)

## 2019-02-11 RX ORDER — HEPARIN SODIUM 5000 [USP'U]/ML
1400 INJECTION INTRAVENOUS; SUBCUTANEOUS
Qty: 25000 | Refills: 0 | Status: DISCONTINUED | OUTPATIENT
Start: 2019-02-11 | End: 2019-02-11

## 2019-02-11 RX ORDER — ENOXAPARIN SODIUM 100 MG/ML
40 INJECTION SUBCUTANEOUS DAILY
Qty: 0 | Refills: 0 | Status: DISCONTINUED | OUTPATIENT
Start: 2019-02-11 | End: 2019-02-11

## 2019-02-11 RX ORDER — ENOXAPARIN SODIUM 100 MG/ML
40 INJECTION SUBCUTANEOUS DAILY
Refills: 0 | Status: DISCONTINUED | OUTPATIENT
Start: 2019-02-11 | End: 2019-02-13

## 2019-02-11 RX ADMIN — ENOXAPARIN SODIUM 40 MILLIGRAM(S): 100 INJECTION SUBCUTANEOUS at 18:00

## 2019-02-11 RX ADMIN — HEPARIN SODIUM 14 UNIT(S)/HR: 5000 INJECTION INTRAVENOUS; SUBCUTANEOUS at 03:54

## 2019-02-11 RX ADMIN — CLOPIDOGREL BISULFATE 75 MILLIGRAM(S): 75 TABLET, FILM COATED ORAL at 13:11

## 2019-02-11 RX ADMIN — Medication 81 MILLIGRAM(S): at 13:12

## 2019-02-11 RX ADMIN — ESCITALOPRAM OXALATE 10 MILLIGRAM(S): 10 TABLET, FILM COATED ORAL at 13:12

## 2019-02-11 RX ADMIN — ATORVASTATIN CALCIUM 40 MILLIGRAM(S): 80 TABLET, FILM COATED ORAL at 22:14

## 2019-02-11 RX ADMIN — Medication 25 MILLIGRAM(S): at 06:16

## 2019-02-11 RX ADMIN — LISINOPRIL 5 MILLIGRAM(S): 2.5 TABLET ORAL at 06:16

## 2019-02-11 NOTE — PROGRESS NOTE ADULT - SUBJECTIVE AND OBJECTIVE BOX
SUBJECTIVE:    Patient is a 48y old Male who presents with a chief complaint of chest pain (10 Feb 2019 11:57)    Currently admitted to medicine with the primary diagnosis of Chest pain     Today is hospital day 3d. Overnight no event on tele. Denied CP, SOB, abd pain, dysuria, constipation/ diarrhea.    PAST MEDICAL & SURGICAL HISTORY  Coronary artery disease  High cholesterol  Hypertension, unspecified type  History of quadruple bypass  History of appendectomy        ALLERGIES:  No Known Allergies    MEDICATIONS:  STANDING MEDICATIONS  aspirin  chewable 81 milliGRAM(s) Oral daily  atorvastatin 40 milliGRAM(s) Oral at bedtime  clopidogrel Tablet 75 milliGRAM(s) Oral daily  escitalopram 10 milliGRAM(s) Oral daily  lisinopril 5 milliGRAM(s) Oral daily  metoprolol tartrate 25 milliGRAM(s) Oral two times a day    PRN MEDICATIONS    VITALS:   T(F): 96  HR: 60  BP: 114/68  RR: 18  SpO2: 98%    LABS:    02-11    141  |  101  |  18  ----------------------------<  92  4.5   |  25  |  1.1    Ca    9.2      11 Feb 2019 06:05      PTT - ( 11 Feb 2019 06:05 )  PTT:30.8 sec          CARDIAC MARKERS ( 09 Feb 2019 11:55 )  x     / 0.06 ng/mL / 126 U/L / x     / 3.1 ng/mL      RADIOLOGY:  < from: Transthoracic Echocardiogram (02.10.19 @ 17:28) >   1. LV Ejection Fraction by Coon's Method with a biplane EF of 63 %.   2. Spectral Doppler shows impaired relaxation pattern of left   ventricular myocardial filling (Grade I diastolic dysfunction).   3. Mild tricuspid regurgitation.    < end of copied text >    PHYSICAL EXAM:  GENERAL: NAD, well-developed  HEAD:  Atraumatic, Normocephalic  EYES: EOMI, PERRLA, conjunctiva and sclera clear  NECK: Supple, No JVD  Pulm: Clear to auscultation bilaterally; No wheeze  CV: Regular rate and rhythm; No murmurs, rubs, or gallops  GI: Soft, Nontender, Nondistended; Bowel sounds present  EXTREMITIES:  2+ Peripheral Pulses, No clubbing, cyanosis, or edema  PSYCH: AAOx3  NEUROLOGY: non-focal  SKIN: No rashes or lesions

## 2019-02-11 NOTE — PROGRESS NOTE ADULT - ASSESSMENT
48 ym with pmh htn, cad s/p cabg ( 4 years ago) and stents ( march 2018- 3 stents) p/w chest pain. Pending cath tomorrow    NSTEMI- No CP overnight  - Cath tomorrow, NPO midnight  - trop trend- negative> 0.07 > 0.15> 0.13 >0.06   - ECG- NSR  - No tele events overnight  - TTE- EF 63%, G1DD, mild TR  - Hep stopped at 4 AM. No further need for heparin.  - lipid panel- high cholesterol, normal HDL.   - c/w asa, lipitor, metoprolol, statin, plavix    2) h/o HTN- controlled.   c/w lisinopril    3) h/o depression  - c/w lexapro    4) dvt ppx  diet- dash  dispo- home  Full code  activity- as tolerated. 48 ym with pmh htn, cad s/p cabg ( 4 years ago) and stents ( march 2018- 3 stents) p/w chest pain. Pending cath tomorrow    NSTEMI- No CP overnight  - Cath tomorrow, NPO midnight  - trop trend- negative> 0.07 > 0.15> 0.13 >0.06   - ECG- NSR  - No tele events overnight  - TTE- EF 63%, G1DD, mild TR  - Hep stopped at 4 AM. No further need for heparin.  - lipid panel- high cholesterol, normal HDL.   - c/w asa, lipitor, metoprolol, statin, plavix    2) h/o HTN- controlled.   c/w lisinopril    3) h/o depression  - c/w lexapro    4) dvt ppx- lovenox 40 qd, hold before cath  diet- dash  dispo- home  Full code  activity- as tolerated.

## 2019-02-12 LAB
ALBUMIN SERPL ELPH-MCNC: 4.1 G/DL — SIGNIFICANT CHANGE UP (ref 3.5–5.2)
ALP SERPL-CCNC: 60 U/L — SIGNIFICANT CHANGE UP (ref 30–115)
ALT FLD-CCNC: 43 U/L — HIGH (ref 0–41)
ANION GAP SERPL CALC-SCNC: 15 MMOL/L — HIGH (ref 7–14)
APTT BLD: 33 SEC — SIGNIFICANT CHANGE UP (ref 27–39.2)
AST SERPL-CCNC: 27 U/L — SIGNIFICANT CHANGE UP (ref 0–41)
BILIRUB SERPL-MCNC: 0.3 MG/DL — SIGNIFICANT CHANGE UP (ref 0.2–1.2)
BLD GP AB SCN SERPL QL: SIGNIFICANT CHANGE UP
BUN SERPL-MCNC: 19 MG/DL — SIGNIFICANT CHANGE UP (ref 10–20)
CALCIUM SERPL-MCNC: 9.7 MG/DL — SIGNIFICANT CHANGE UP (ref 8.5–10.1)
CHLORIDE SERPL-SCNC: 100 MMOL/L — SIGNIFICANT CHANGE UP (ref 98–110)
CO2 SERPL-SCNC: 26 MMOL/L — SIGNIFICANT CHANGE UP (ref 17–32)
CREAT SERPL-MCNC: 1.1 MG/DL — SIGNIFICANT CHANGE UP (ref 0.7–1.5)
GLUCOSE SERPL-MCNC: 89 MG/DL — SIGNIFICANT CHANGE UP (ref 70–99)
HCT VFR BLD CALC: 41.9 % — LOW (ref 42–52)
HGB BLD-MCNC: 14.2 G/DL — SIGNIFICANT CHANGE UP (ref 14–18)
INR BLD: 0.96 RATIO — SIGNIFICANT CHANGE UP (ref 0.65–1.3)
MAGNESIUM SERPL-MCNC: 1.9 MG/DL — SIGNIFICANT CHANGE UP (ref 1.8–2.4)
MCHC RBC-ENTMCNC: 30.5 PG — SIGNIFICANT CHANGE UP (ref 27–31)
MCHC RBC-ENTMCNC: 33.9 G/DL — SIGNIFICANT CHANGE UP (ref 32–37)
MCV RBC AUTO: 89.9 FL — SIGNIFICANT CHANGE UP (ref 80–94)
NRBC # BLD: 0 /100 WBCS — SIGNIFICANT CHANGE UP (ref 0–0)
PLATELET # BLD AUTO: 247 K/UL — SIGNIFICANT CHANGE UP (ref 130–400)
POTASSIUM SERPL-MCNC: 4.3 MMOL/L — SIGNIFICANT CHANGE UP (ref 3.5–5)
POTASSIUM SERPL-SCNC: 4.3 MMOL/L — SIGNIFICANT CHANGE UP (ref 3.5–5)
PROT SERPL-MCNC: 6.6 G/DL — SIGNIFICANT CHANGE UP (ref 6–8)
PROTHROM AB SERPL-ACNC: 11 SEC — SIGNIFICANT CHANGE UP (ref 9.95–12.87)
RBC # BLD: 4.66 M/UL — LOW (ref 4.7–6.1)
RBC # FLD: 11.6 % — SIGNIFICANT CHANGE UP (ref 11.5–14.5)
SODIUM SERPL-SCNC: 141 MMOL/L — SIGNIFICANT CHANGE UP (ref 135–146)
TYPE + AB SCN PNL BLD: SIGNIFICANT CHANGE UP
WBC # BLD: 6.06 K/UL — SIGNIFICANT CHANGE UP (ref 4.8–10.8)
WBC # FLD AUTO: 6.06 K/UL — SIGNIFICANT CHANGE UP (ref 4.8–10.8)

## 2019-02-12 RX ORDER — ACETAMINOPHEN 500 MG
650 TABLET ORAL EVERY 6 HOURS
Qty: 0 | Refills: 0 | Status: DISCONTINUED | OUTPATIENT
Start: 2019-02-12 | End: 2019-02-13

## 2019-02-12 RX ORDER — SODIUM CHLORIDE 9 MG/ML
1000 INJECTION INTRAMUSCULAR; INTRAVENOUS; SUBCUTANEOUS
Qty: 0 | Refills: 0 | Status: DISCONTINUED | OUTPATIENT
Start: 2019-02-12 | End: 2019-02-13

## 2019-02-12 RX ORDER — CHLORHEXIDINE GLUCONATE 213 G/1000ML
1 SOLUTION TOPICAL
Qty: 0 | Refills: 0 | Status: DISCONTINUED | OUTPATIENT
Start: 2019-02-12 | End: 2019-02-13

## 2019-02-12 RX ADMIN — CHLORHEXIDINE GLUCONATE 1 APPLICATION(S): 213 SOLUTION TOPICAL at 08:00

## 2019-02-12 RX ADMIN — ESCITALOPRAM OXALATE 10 MILLIGRAM(S): 10 TABLET, FILM COATED ORAL at 17:40

## 2019-02-12 RX ADMIN — Medication 25 MILLIGRAM(S): at 17:41

## 2019-02-12 RX ADMIN — Medication 25 MILLIGRAM(S): at 05:42

## 2019-02-12 RX ADMIN — Medication 81 MILLIGRAM(S): at 12:48

## 2019-02-12 RX ADMIN — CLOPIDOGREL BISULFATE 75 MILLIGRAM(S): 75 TABLET, FILM COATED ORAL at 12:49

## 2019-02-12 RX ADMIN — LISINOPRIL 5 MILLIGRAM(S): 2.5 TABLET ORAL at 05:42

## 2019-02-12 RX ADMIN — Medication 650 MILLIGRAM(S): at 17:59

## 2019-02-12 RX ADMIN — ATORVASTATIN CALCIUM 40 MILLIGRAM(S): 80 TABLET, FILM COATED ORAL at 22:29

## 2019-02-12 NOTE — PROGRESS NOTE ADULT - ASSESSMENT
48 ym with pmh htn, cad s/p cabg ( 4 years ago) and stents ( march 2018- 3 stents) p/w chest pain. s/p MAEVE to EDUIN to OM anastomosis.    NSTEMI- No CP overnight  - Cath-MAEVE to EDUIN to OM anastomosis.  - trop trend- negative> 0.07 > 0.15> 0.13 >0.06   - ECG- NSR  - No tele events overnight  - TTE- EF 63%, G1DD, mild TR  - lipid panel- high cholesterol, normal HDL.   - c/w asa, lipitor, metoprolol, statin, plavix    2) h/o HTN- controlled.   c/w lisinopril    3) h/o depression  - c/w lexapro    4) dvt ppx- lovenox 40 qd  diet- dash  dispo- home  Full code  activity- as tolerated.     D/C tomorrow if stable 48 ym with pmh htn, cad s/p cabg ( 4 years ago) and stents ( march 2018- 3 stents) p/w chest pain. s/p MAEVE to EDUIN to OM anastomosis.    NSTEMI- No CP overnight  - Cath-MAEVE to EDUIN to OM anastomosis.  - trop trend- negative> 0.07 > 0.15> 0.13 >0.06   - ECG- NSR  - No tele events overnight  - TTE- EF 63%, G1DD, mild TR  - lipid panel- high cholesterol, normal HDL.   - c/w asa, lipitor, metoprolol, statin, plavix    2) h/o HTN- controlled.   c/w lisinopril    3) h/o depression  - c/w lexapro    4) dvt ppx- lovenox 40 qd  diet- dash  dispo- home  Full code      D/C tomorrow if stable

## 2019-02-12 NOTE — PROGRESS NOTE ADULT - SUBJECTIVE AND OBJECTIVE BOX
POST OPERATIVE PROCEDURAL DOCUMENTATION  PRE-OP DIAGNOSIS: [place] NSTEMI      PROCEDURE:   LEFT HEART CATHERIZATION    Physician:  Ayaz Ruiz MD  Assistant:  geremias OSCAR    ANESTHESIA TYPE:  [ X] Sedation  [ X] Local/Regional  [   ]General Anesthesia    ESTIMATED BLOOD LOSS:      less than 10 mL    CONTRAST:     CONDITION  [X ] Good  [   ] Fair  [   ] Serious  [   ] Critical      SPECIMENS REMOVED (IF APPLICABLE):   None        IMPLANTS 3.5 x 12 marleny to eduin to om      FINDINGS:  LEFT HEART CATHERIZATION                                    LVEF%:nl on echo   LVEDP: 10mmHg    Left main:  NORMAL    LAD:  Total Mid         Fills via LIMA o LAD with distal stent at anastomosis.                  Left Circumflex:  Total distal   OM1 large fills via Free EDUIN       Right Cornary Artery:  Total prox  Fills via SVG to RPDA and RPL in seq fashion.          RIGHT HEART CATHERIZATION ( Not Performed)  PA:  PCW:  CO/CI:    PERCUTANEOUS CORONARY INTERVENTIONS:      COMPLICATIONS:  None      POST-OP DIAGNOSIS    [ ] CAD  [ ] Normal Coronary Arteries  [ ] Luminal Irregularities  [ ] Non-obstructive CAD        PLAN OF CARE    [ ] D/C Home today   [xx ]  D/C in AM  [xx ] Return to In-patient bed  [ ] Admit for observation  [ ] Return for staged procedure:  [ ] CT Surgery consult called  [x ]  Continue DAPT, B-blocker & Statin therapy  [ ]  Medical Therapy  [ X] Aggressive risk factor modification. The patient should follow a low fat and low calorie diet.    Post MARLENY to EDUIN to OM anastomosis with 3.5 x 12 marleny.  did well am DC.

## 2019-02-12 NOTE — PROGRESS NOTE ADULT - ASSESSMENT
47yo M with Past Medical History HTN, Depression, CAD status post CABG and PCI x3 in March 2018 admitted for chest pain secondary to NSTEMI.     NSTEMI: status post cardiac catheterization with MAEVE to EDUIN to [place].  Monitor on telemetry post cardiac catheterization.  Continue ASA, Lipitor, Lopressor, and Plavix.  Follow-up with cardiology in AM.  Echocardiogram demonstrates preserved EF.  Hypertension: BP stable, continue Lopressor and Lisinopril  Depression: continue Lexapro  GI/DVT prophylaxis  Anticipate discharge home in 24 hours

## 2019-02-13 ENCOUNTER — TRANSCRIPTION ENCOUNTER (OUTPATIENT)
Age: 49
End: 2019-02-13

## 2019-02-13 VITALS
HEART RATE: 75 BPM | TEMPERATURE: 98 F | SYSTOLIC BLOOD PRESSURE: 124 MMHG | RESPIRATION RATE: 20 BRPM | DIASTOLIC BLOOD PRESSURE: 74 MMHG

## 2019-02-13 LAB
ALBUMIN SERPL ELPH-MCNC: 4 G/DL — SIGNIFICANT CHANGE UP (ref 3.5–5.2)
ALP SERPL-CCNC: 58 U/L — SIGNIFICANT CHANGE UP (ref 30–115)
ALT FLD-CCNC: 59 U/L — HIGH (ref 0–41)
ANION GAP SERPL CALC-SCNC: 16 MMOL/L — HIGH (ref 7–14)
AST SERPL-CCNC: 39 U/L — SIGNIFICANT CHANGE UP (ref 0–41)
BASOPHILS # BLD AUTO: 0.03 K/UL — SIGNIFICANT CHANGE UP (ref 0–0.2)
BASOPHILS NFR BLD AUTO: 0.5 % — SIGNIFICANT CHANGE UP (ref 0–1)
BILIRUB SERPL-MCNC: 0.2 MG/DL — SIGNIFICANT CHANGE UP (ref 0.2–1.2)
BUN SERPL-MCNC: 15 MG/DL — SIGNIFICANT CHANGE UP (ref 10–20)
CALCIUM SERPL-MCNC: 8.9 MG/DL — SIGNIFICANT CHANGE UP (ref 8.5–10.1)
CHLORIDE SERPL-SCNC: 102 MMOL/L — SIGNIFICANT CHANGE UP (ref 98–110)
CO2 SERPL-SCNC: 22 MMOL/L — SIGNIFICANT CHANGE UP (ref 17–32)
CREAT SERPL-MCNC: 1 MG/DL — SIGNIFICANT CHANGE UP (ref 0.7–1.5)
EOSINOPHIL # BLD AUTO: 0.21 K/UL — SIGNIFICANT CHANGE UP (ref 0–0.7)
EOSINOPHIL NFR BLD AUTO: 3.2 % — SIGNIFICANT CHANGE UP (ref 0–8)
GLUCOSE SERPL-MCNC: 84 MG/DL — SIGNIFICANT CHANGE UP (ref 70–99)
HCT VFR BLD CALC: 41.4 % — LOW (ref 42–52)
HGB BLD-MCNC: 13.9 G/DL — LOW (ref 14–18)
IMM GRANULOCYTES NFR BLD AUTO: 0.8 % — HIGH (ref 0.1–0.3)
LYMPHOCYTES # BLD AUTO: 1.55 K/UL — SIGNIFICANT CHANGE UP (ref 1.2–3.4)
LYMPHOCYTES # BLD AUTO: 24 % — SIGNIFICANT CHANGE UP (ref 20.5–51.1)
MAGNESIUM SERPL-MCNC: 1.8 MG/DL — SIGNIFICANT CHANGE UP (ref 1.8–2.4)
MCHC RBC-ENTMCNC: 30.1 PG — SIGNIFICANT CHANGE UP (ref 27–31)
MCHC RBC-ENTMCNC: 33.6 G/DL — SIGNIFICANT CHANGE UP (ref 32–37)
MCV RBC AUTO: 89.6 FL — SIGNIFICANT CHANGE UP (ref 80–94)
MONOCYTES # BLD AUTO: 0.8 K/UL — HIGH (ref 0.1–0.6)
MONOCYTES NFR BLD AUTO: 12.4 % — HIGH (ref 1.7–9.3)
NEUTROPHILS # BLD AUTO: 3.83 K/UL — SIGNIFICANT CHANGE UP (ref 1.4–6.5)
NEUTROPHILS NFR BLD AUTO: 59.1 % — SIGNIFICANT CHANGE UP (ref 42.2–75.2)
NRBC # BLD: 0 /100 WBCS — SIGNIFICANT CHANGE UP (ref 0–0)
PLATELET # BLD AUTO: 237 K/UL — SIGNIFICANT CHANGE UP (ref 130–400)
POTASSIUM SERPL-MCNC: 4.5 MMOL/L — SIGNIFICANT CHANGE UP (ref 3.5–5)
POTASSIUM SERPL-SCNC: 4.5 MMOL/L — SIGNIFICANT CHANGE UP (ref 3.5–5)
PROT SERPL-MCNC: 6.2 G/DL — SIGNIFICANT CHANGE UP (ref 6–8)
RBC # BLD: 4.62 M/UL — LOW (ref 4.7–6.1)
RBC # FLD: 11.7 % — SIGNIFICANT CHANGE UP (ref 11.5–14.5)
SODIUM SERPL-SCNC: 140 MMOL/L — SIGNIFICANT CHANGE UP (ref 135–146)
WBC # BLD: 6.47 K/UL — SIGNIFICANT CHANGE UP (ref 4.8–10.8)
WBC # FLD AUTO: 6.47 K/UL — SIGNIFICANT CHANGE UP (ref 4.8–10.8)

## 2019-02-13 RX ORDER — CLOPIDOGREL BISULFATE 75 MG/1
1 TABLET, FILM COATED ORAL
Qty: 30 | Refills: 0
Start: 2019-02-13 | End: 2019-03-14

## 2019-02-13 RX ORDER — ATORVASTATIN CALCIUM 80 MG/1
1 TABLET, FILM COATED ORAL
Qty: 0 | Refills: 0 | COMMUNITY

## 2019-02-13 RX ORDER — ATORVASTATIN CALCIUM 80 MG/1
1 TABLET, FILM COATED ORAL
Qty: 0 | Refills: 0 | DISCHARGE
Start: 2019-02-13

## 2019-02-13 RX ORDER — ATORVASTATIN CALCIUM 80 MG/1
1 TABLET, FILM COATED ORAL
Qty: 30 | Refills: 0 | OUTPATIENT
Start: 2019-02-13 | End: 2019-03-14

## 2019-02-13 RX ADMIN — LISINOPRIL 5 MILLIGRAM(S): 2.5 TABLET ORAL at 05:52

## 2019-02-13 RX ADMIN — Medication 25 MILLIGRAM(S): at 05:52

## 2019-02-13 NOTE — DISCHARGE NOTE ADULT - PLAN OF CARE
Treat You had a stent placed in your heart.  Please take medications as prescribed including aspirin, plavix, metoprolol, lisinopril, and atorvastatin.  It is important to take plavix to prevent occlusion of the stent.  Follow low salt and low fat diet.   Follow up with your private cardiologist Dr. Leonard and primary care Dr. Martinez in 1 week. Please repeat blood work for liver function test (ALT 59 today). If your liver function is fine, consider increasing atorvastatin to 80 mg daily.  Return to emergency room if worsening symptoms including chest pain and bleeding from cath site. You had a stent placed in your heart.  Please take medications as prescribed including aspirin, plavix, metoprolol, lisinopril, and atorvastatin.  It is important to take plavix to prevent occlusion of the stent.  Follow low salt and low fat diet.   Follow up with your private cardiologist Dr. Ruiz and primary care Dr. Martinez in 1 week. Please repeat blood work for liver function test (ALT 59 today). If your liver function is fine, consider increasing atorvastatin to 80 mg daily.  Return to emergency room if worsening symptoms including chest pain and bleeding from cath site.

## 2019-02-13 NOTE — DISCHARGE NOTE ADULT - CARE PROVIDER_API CALL
Jeferson Leonard)  Cardiovascular Disease  [address] [place] [address]  Phone: [phone]  Fax: [phone]  Follow Up Time:     Sonya Martinez  Phone: [phone]  Fax: (   )    -  Follow Up Time: Sonya Martinez  Phone: [phone]  Fax: (   )    -  Follow Up Time:     Ayaz Ruiz)  Cardiovascular Disease; Interventional Cardiology  [address] [place] [address]  Phone: [phone]  Fax: [phone]  Follow Up Time:

## 2019-02-13 NOTE — DISCHARGE NOTE ADULT - HOSPITAL COURSE
48 ym with pmh htn, cad s/p cabg ( 4 years ago) and stents ( march 2018- 3 stents) p/w chest pain.     NSTEMI  - Cath-MAEVE to EDUIN to OM anastomosis.  - trop trend- negative> 0.07 > 0.15> 0.13 >0.06   - ECG- NSR  - No tele events overnight  - TTE- EF 63%, G1DD, mild TR  - lipid panel- high cholesterol, normal HDL.   - c/w asa, lipitor, metoprolol, statin, plavix  -ALT 59, consider inc atorvastatin to 80 from 40 if outpt LFT wnl in 1 week    2) h/o HTN- controlled.   c/w lisinopril    3) h/o depression  - c/w lexapro    Pt will be discharged home

## 2019-02-13 NOTE — DISCHARGE NOTE ADULT - PROVIDER TOKENS
PROVIDER:[TOKEN:[[id number]]],FREE:[LAST:[Juan],FIRST:[Sonya],PHONE:[phone]],FAX:[(   )    -]] FREE:[LAST:[Juan],FIRST:[Sonya],PHONE:[phone]],FAX:[(   )    -]],PROVIDER:[TOKEN:[[id number]]]

## 2019-02-13 NOTE — PROGRESS NOTE ADULT - SUBJECTIVE AND OBJECTIVE BOX
no chest pain   no sob   doing well     Vital Signs Last 24 Hrs  T(C): 36.2 (13 Feb 2019 06:08), Max: 36.4 (12 Feb 2019 20:47)  T(F): 97.2 (13 Feb 2019 06:08), Max: 97.5 (12 Feb 2019 20:47)  HR: 94 (13 Feb 2019 06:08) (65 - 94)  BP: 105/66 (13 Feb 2019 06:08) (105/66 - 114/61)  BP(mean): --  RR: 18 (13 Feb 2019 06:08) (18 - 18)  SpO2: 97% (12 Feb 2019 20:11) (97% - 97%)    PHYSICAL EXAM:  GENERAL: NAD, well-developed  HEAD:  Atraumatic, Normocephalic  EYES: EOMI, PERRLA, conjunctiva and sclera clear  NECK: Supple, No JVD  CHEST/LUNG: Clear to auscultation bilaterally; No wheeze  HEART: Regular rate and rhythm; No murmurs, rubs, or gallops  ABDOMEN: Soft, Nontender, Nondistended; Bowel sounds present  EXTREMITIES:  2+ Peripheral Pulses, No clubbing, cyanosis, or edema  PSYCH: AAOx3  NEUROLOGY: non-focal  SKIN: No rashes or lesions                          13.9   6.47  )-----------( 237      ( 13 Feb 2019 05:18 )             41.4     02-13    140  |  102  |  15  ----------------------------<  84  4.5   |  22  |  1.0    Ca    8.9      13 Feb 2019 05:18  Mg     1.8     02-13    TPro  6.2  /  Alb  4.0  /  TBili  0.2  /  DBili  x   /  AST  39  /  ALT  59<H>  /  AlkPhos  58  02-13    LIVER FUNCTIONS - ( 13 Feb 2019 05:18 )  Alb: 4.0 g/dL / Pro: 6.2 g/dL / ALK PHOS: 58 U/L / ALT: 59 U/L / AST: 39 U/L / GGT: x           PT/INR - ( 12 Feb 2019 05:25 )   PT: 11.00 sec;   INR: 0.96 ratio         PTT - ( 12 Feb 2019 05:25 )  PTT:33.0 sec

## 2019-02-13 NOTE — DISCHARGE NOTE ADULT - CARE PLAN
Principal Discharge DX:	NSTEMI (non-ST elevated myocardial infarction)  Goal:	Treat  Assessment and plan of treatment:	You had a stent placed in your heart.  Please take medications as prescribed including aspirin, plavix, metoprolol, lisinopril, and atorvastatin.  It is important to take plavix to prevent occlusion of the stent.  Follow low salt and low fat diet.   Follow up with your private cardiologist Dr. Leonard and primary care Dr. Martinez in 1 week. Please repeat blood work for liver function test (ALT 59 today). If your liver function is fine, consider increasing atorvastatin to 80 mg daily.  Return to emergency room if worsening symptoms including chest pain and bleeding from cath site. Principal Discharge DX:	NSTEMI (non-ST elevated myocardial infarction)  Goal:	Treat  Assessment and plan of treatment:	You had a stent placed in your heart.  Please take medications as prescribed including aspirin, plavix, metoprolol, lisinopril, and atorvastatin.  It is important to take plavix to prevent occlusion of the stent.  Follow low salt and low fat diet.   Follow up with your private cardiologist Dr. Ruiz and primary care Dr. Martinez in 1 week. Please repeat blood work for liver function test (ALT 59 today). If your liver function is fine, consider increasing atorvastatin to 80 mg daily.  Return to emergency room if worsening symptoms including chest pain and bleeding from cath site.

## 2019-02-13 NOTE — DISCHARGE NOTE ADULT - MEDICATION SUMMARY - MEDICATIONS TO TAKE
I will START or STAY ON the medications listed below when I get home from the hospital:    aspirin  -- 81 milligram(s) by mouth once a day  -- Indication: For NSTEMI    lisinopril 5 mg oral tablet  -- 1 tab(s) by mouth once a day  -- Indication: For NSTEMI    Lexapro  -- 10 milligram(s) by mouth once a day  -- Indication: For Mood    atorvastatin 40 mg oral tablet  -- 1 tab(s) by mouth once a day (at bedtime)  -- Indication: For NSTEMI    clopidogrel 75 mg oral tablet  -- 1 tab(s) by mouth once a day  -- Indication: For NSTEMI    metoprolol  -- 25 milligram(s) by mouth 2 times a day  -- Indication: For NSTEMI

## 2019-02-13 NOTE — PROGRESS NOTE ADULT - ASSESSMENT
NSTEMI: status post cardiac catheterization with MAEVE to EDUIN to OM anastamosis.Continue ASA, Lipitor 40 mg qday repeat LFT's in one week if ALT better then may increase to 80 as OPT, Lopressor, and Plavix. Echocardiogram demonstrates preserved EF.    Hypertension: BP stable, continue Lopressor and Lisinopril    Depression: continue Lexapro    GI/DVT prophylaxis    discharge today spent 32 min on discharge

## 2019-02-13 NOTE — PROGRESS NOTE ADULT - SUBJECTIVE AND OBJECTIVE BOX
Cardiology Follow up    MIGDALIA FITZPATRICK   48y Male  PAST MEDICAL & SURGICAL HISTORY:  Coronary artery disease  High cholesterol  Hypertension, unspecified type  History of quadruple bypass  History of appendectomy       HPI:  48 y m with pmh of htn, depression, cad s/p cabg and stent in p/w chest pain since morning which he describes as dull, pressure like radiating to back and shoulders, 5/10 now 1/10, intermittent, along with sweats but no lightheadedness, n/v, palpitations , leg swelling, fever ,chills. he mentions that pain is similar to last time he had stent placed but less intensity. he had  exercise stress test negative in may (2019 19:37)    Allergies    No Known Allergies    Patient had slight chest discomfort at 9 pm yesterday after PCI on  level 310. 12 lead ECG done. No changes.   Patient without complaints. Pt ambulated without issues/symptoms  Today denies CP, SOB, palpitations, or dizziness  No events on telemetry overnight    Vital Signs Last 24 Hrs  T(C): 36.2 (2019 06:08), Max: 36.4 (2019 20:47)  T(F): 97.2 (2019 06:08), Max: 97.5 (2019 20:47)  HR: 94 (2019 06:08) (65 - 94)  BP: 105/66 (2019 06:08) (105/66 - 114/61)  BP(mean): --  RR: 18 (2019 06:08) (18 - 18)  SpO2: 97% (2019 20:11) (97% - 97%)    MEDICATIONS  (STANDING):  aspirin  chewable 81 milliGRAM(s) Oral daily  atorvastatin 40 milliGRAM(s) Oral at bedtime  chlorhexidine 4% Liquid 1 Application(s) Topical <User Schedule>  clopidogrel Tablet 75 milliGRAM(s) Oral daily  enoxaparin Injectable 40 milliGRAM(s) SubCutaneous daily  escitalopram 10 milliGRAM(s) Oral daily  lisinopril 5 milliGRAM(s) Oral daily  metoprolol tartrate 25 milliGRAM(s) Oral two times a day  sodium chloride 0.9%. 1000 milliLiter(s) (100 mL/Hr) IV Continuous <Continuous>    MEDICATIONS  (PRN):  acetaminophen   Tablet .. 650 milliGRAM(s) Oral every 6 hours PRN Moderate Pain (4 - 6)    REVIEW OF SYSTEMS:          CONSTITUTIONAL: No weakness, fevers or chills          EYES/ENT: No visual changes;  No vertigo or throat pain           NECK: No pain or stiffness          RESPIRATORY: No cough, wheezing, hemoptysis          CARDIOVASCULAR: no pain, no ACKERMAN, no palpitations           GASTROINTESTINAL: No abdominal or epigastric pain. No nausea, vomiting, or hematemesis;           GENITOURINARY: No dysuria, frequency or hematuria          NEUROLOGICAL: No numbness or weakness          SKIN: No itching, rashes    PHYSICAL EXAM:           CONSTITUTIONAL: Well-developed; well-nourished; in no acute distress  	SKIN: warm, dry  	HEAD: Normocephalic; atraumatic  	EYES: PERRL.  	ENT: No nasal discharge, airway clear, mucous membranes moist  	NECK: Supple; non tender.  	CARD: +S1, +S2, no murmurs, gallops, or rubs. Regular rate and rhythm    	RESP: No wheezes, rales or rhonchi. CTA B/L  	ABD: soft ntnd, + BS x 4 quadrants  	EXT: moves all extremities,  no clubbing, cyanosis or edema  	NEURO: Alert and oriented x3, no focal deficits          PSYCH: Cooperative, appropriate          VASCULAR:  +2 Rad / +2PTs / + 2DPs          EXTREMITY:             Left Radial: Dressing removed, soft, no hematoma, no pain            ECG:   NSR, NO ischemic changes ECG done on 2019    < from: 12 Lead ECG (19 @ 09:59) >  Ventricular Rate 66 BPM    Atrial Rate 66 BPM    P-R Interval 172 ms    QRS Duration 88 ms    Q-T Interval 380 ms    QTC Calculation(Bezet) 398 ms    P Axis 36 degrees    R Axis 50 degrees    T Axis 55 degrees    Diagnosis Line Normal sinus rhythm  Normal ECG    Confirmed by Derick Fuller ([id number]) on 2/10/2019 1:23:04 PM                                                                                                               2D ECHO:  < from: Transthoracic Echocardiogram (02.10.19 @ 17:28) >  EXAM:  2-D ECHO (TTE) COMPLETE        PROCEDURE DATE:  02/10/2019      INTERPRETATION:  REPORT:    TRANSTHORACIC ECHOCARDIOGRAM REPORT         Patient Name:   MIGDALIA FITZPATRICK Accession #: [id number]  Medical Rec #:  [id number]         Height:      70.0 in 177.8 cm  YOB: 1970        Weight:      230.0 lb 104.33 kg  Patient Age:    48 years         BSA:         2.22 m²  Patient Gender: M                BP:          112/56 mmHg       Date of Exam:        2/10/2019 5:28:13 PM  Referring Physician: [id number] CHAUNCEY MINA  Sonographer:         Calli Ogden  Reading Physician:   Derick Fuller M.D.    Procedure:   2D Echo/Doppler/Color Doppler Complete.  Indications: R07.9 - Chest Pain, unspecified  Diagnosis:   R07.9 - Chest Pain, unspecified         Summary:   1. LV Ejection Fraction by Coon's Method with a biplane EF of 63 %.   2. Spectral Doppler shows impaired relaxation pattern of left   ventricular myocardial filling (Grade I diastolic dysfunction).   3. Mild tricuspid regurgitation.    PHYSICIAN INTERPRETATION:  Left Ventricle: Normal left ventricular size and wall thicknesses, with   normal systolic function. There is no left ventricular hypertrophy.   Spectral Doppler shows impaired relaxation pattern of left ventricular   myocardial filling (Grade I diastolic dysfunction). LV Ejection Fraction   by Coon's Method with a biplane EF of 63 %.  Right Ventricle: Normal right ventricular size and function.  Left Atrium: Mildly enlarged left atrium. Mobile intra-atrial septum.  Right Atrium: Normal right atrial size.  Pericardium: There is no evidence of pericardial effusion.  Mitral Valve: Structurally normal mitral valve, with normal leaflet   excursion. No evidence of mitral valve regurgitation is seen.  Tricuspid Valve: Structurally normal tricuspid valve, with normal leaflet   excursion. Mild tricuspid regurgitation is visualized.  Aortic Valve: Normal trileaflet aortic valve with normal opening. No   evidence of aortic valve regurgitation is seen.  Pulmonic Valve: Structurally normal pulmonic valve, with normal leaflet   excursion. No indication of pulmonic valve regurgitation.  Aorta: The aortic root and ascending aorta are structurally normal, with   no evidence of dilitation.  PulmonaryArtery: The main pulmonary artery is normal in size.       2D AND M-MODE MEASUREMENTS (normal ranges within parentheses):  Left                  Normal   Aorta/Left             Normal  Ventricle:                     Atrium:  IVSd (2D):  0.82 cm  (0.7-1.1) AoV Cusp       2.67  (1.5-2.6)  LVPWd (2D): 0.98 cm  (0.7-1.1) Separation:     cm  LVIDd (2D): 5.56 cm  (3.4-5.7) Left Atrium    4.26  (1.9-4.0)  LVIDs (2D): 3.20 cm            (Mmode):        cm  LV FS (2D):  42.4 %   (>25%)   LA Volume      12.1  IVSd        0.95 cm  (0.7-1.1) Index         ml/m²  (Mmode):                       Right  LVPWd       1.24 cm  (0.7-1.1) Ventricle:  (Mmode):                       RVd (2D):      4.08 cm  LVIDd       6.47 cm  (3.4-5.7)  (Mmode):  LVIDs       4.57 cm  (Mmode):  LV FS        29.4 %   (>25%)  (Mmode):  Relative      0.35    (<0.42)  Wall  Thickness  Rel. Wall     0.38    (<0.42)  Thickness  Mm  LV Mass      142.3  Index:        g/m²  Mmode    SPECTRAL DOPPLER ANALYSIS:  LV DIASTOLIC FUNCTION:  MVPeak E: 0.88 m/s Decel Time: 255 msec  MV Peak A: 0.79 m/s  E/A Ratio: 1.11    Aortic Valve:  AoV VMax:    1.38 m/s AoV Area, Vmax: 3.64 cm² Vmax Indx: 1.65 cm²/m²  AoV Pk Grad: 7.6 mmHg    LVOT Vmax: 1.35 m/s  LVOT VTI:  0.25 m  LVOT Diam: 2.18 cm    Mitral Valve:  MV P1/2 Time: 73.84 msec  MV Area, PHT: 2.98 cm²    Tricuspid Valve and PA/RV Systolic Pressure: TR Max Velocity: 2.59 m/s RA   Pressure: 5 mmHg RVSP/PASP: 31.8 mmHg    Pulmonic Valve:  PV Max Velocity: 1.34 m/s PV Max P.1 mmHg PVMean PG:       [id number] Derick Fuller M.D., Electronically signed on 2/10/2019 at 7:25:43   PM    LABS:                        13.9   6.47  )-----------( 237      ( 2019 05:18 )             41.4     02-13    140  |  102  |  15  ----------------------------<  84  4.5   |  22  |  1.0    Ca    8.9      2019 05:18  Mg     1.8         TPro  6.2  /  Alb  4.0  /  TBili  0.2  /  DBili  x   /  AST  39  /  ALT  59<H>  /  AlkPhos  58      Magnesium, Serum: 1.8 mg/dL [1.8 - 2.4] (19 @ 05:18)  LIVER FUNCTIONS - ( 2019 05:18 )  Alb: 4.0 g/dL / Pro: 6.2 g/dL / ALK PHOS: 58 U/L / ALT: 59 U/L / AST: 39 U/L / GGT: x             A/P:  I discussed the case with Cardiologist Dr. Ricketts and recommend the following:    S/P PCI to EDUIN to OM                     Continue DAPT ( Aspirin 81 mg daily and Plavix 75 mg daily ),  B-Blocker, Statin Therapy                   Patient given 30 day supply of ( Aspirin 81 mg daily and Plavix 75 mg daily ) to take at home                   Patient agreeing to take DAPT for at least one year or as directed by cardiologist                    Pt given instructions on importance of taking antiplatelet medication or risk acute stent thrombosis/death                   Post cath instructions, access site care and activity restrictions reviewed with patient                     Discussed with patient to return to hospital if experience chest pain, shortness breath, dizziness and site bleeding                   Aggressive risk factor modification, diet counseling, smoking cessation discussed with patient                       Can discharge patient from cardiac standpoint                   Follow up with Cardiology Dr. Barron in one week.  Instructed to call and make an appointment

## 2019-02-13 NOTE — PROGRESS NOTE ADULT - PROVIDER SPECIALTY LIST ADULT
Cardiology
Cardiology
Hospitalist
Internal Medicine

## 2019-02-13 NOTE — DISCHARGE NOTE ADULT - PATIENT PORTAL LINK FT
You can access the [facility] Patient Portal, offered by [facility], by registering with the following website: [email or web address]

## 2019-02-13 NOTE — DISCHARGE NOTE ADULT - MEDICATION SUMMARY - MEDICATIONS TO CHANGE
I will SWITCH the dose or number of times a day I take the medications listed below when I get home from the hospital:    Effient 10 mg oral tablet  -- 1 tab(s) by mouth once a day MDD:1  -- It is very important that you take or use this exactly as directed.  Do not skip doses or discontinue unless directed by your doctor.  Obtain medical advice before taking any non-prescription drugs as some may affect the action of this medication.  Swallow whole.  Do not crush.

## 2019-02-20 DIAGNOSIS — E78.5 HYPERLIPIDEMIA, UNSPECIFIED: ICD-10-CM

## 2019-02-20 DIAGNOSIS — Z90.49 ACQUIRED ABSENCE OF OTHER SPECIFIED PARTS OF DIGESTIVE TRACT: ICD-10-CM

## 2019-02-20 DIAGNOSIS — I10 ESSENTIAL (PRIMARY) HYPERTENSION: ICD-10-CM

## 2019-02-20 DIAGNOSIS — I25.10 ATHEROSCLEROTIC HEART DISEASE OF NATIVE CORONARY ARTERY WITHOUT ANGINA PECTORIS: ICD-10-CM

## 2019-02-20 DIAGNOSIS — I21.4 NON-ST ELEVATION (NSTEMI) MYOCARDIAL INFARCTION: ICD-10-CM

## 2019-02-20 DIAGNOSIS — T82.855A STENOSIS OF CORONARY ARTERY STENT, INITIAL ENCOUNTER: ICD-10-CM

## 2019-02-20 DIAGNOSIS — I25.82 CHRONIC TOTAL OCCLUSION OF CORONARY ARTERY: ICD-10-CM
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
## 2019-02-20 DIAGNOSIS — Z87.891 PERSONAL HISTORY OF NICOTINE DEPENDENCE: ICD-10-CM

## 2019-02-20 DIAGNOSIS — Z95.1 PRESENCE OF AORTOCORONARY BYPASS GRAFT: ICD-10-CM

## 2019-02-20 DIAGNOSIS — Z82.49 FAMILY HISTORY OF ISCHEMIC HEART DISEASE AND OTHER DISEASES OF THE CIRCULATORY SYSTEM: ICD-10-CM
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
## 2019-02-20 DIAGNOSIS — Z95.5 PRESENCE OF CORONARY ANGIOPLASTY IMPLANT AND GRAFT: ICD-10-CM

## 2019-02-20 DIAGNOSIS — F32.9 MAJOR DEPRESSIVE DISORDER, SINGLE EPISODE, UNSPECIFIED: ICD-10-CM

## 2019-02-20 DIAGNOSIS — Z79.82 LONG TERM (CURRENT) USE OF ASPIRIN: ICD-10-CM

## 2019-02-20 DIAGNOSIS — I25.810 ATHEROSCLEROSIS OF CORONARY ARTERY BYPASS GRAFT(S) WITHOUT ANGINA PECTORIS: ICD-10-CM

## 2019-05-10 ENCOUNTER — INPATIENT (INPATIENT)
Facility: HOSPITAL | Age: 49
LOS: 1 days | Discharge: HOME | End: 2019-05-12
Attending: HOSPITALIST | Admitting: HOSPITALIST
Payer: COMMERCIAL

## 2019-05-10 VITALS
RESPIRATION RATE: 18 BRPM | TEMPERATURE: 98 F | HEART RATE: 77 BPM | DIASTOLIC BLOOD PRESSURE: 77 MMHG | SYSTOLIC BLOOD PRESSURE: 134 MMHG | OXYGEN SATURATION: 97 %

## 2019-05-10 DIAGNOSIS — Z98.890 OTHER SPECIFIED POSTPROCEDURAL STATES: Chronic | ICD-10-CM

## 2019-05-10 DIAGNOSIS — Z95.1 PRESENCE OF AORTOCORONARY BYPASS GRAFT: Chronic | ICD-10-CM

## 2019-05-10 LAB
ALBUMIN SERPL ELPH-MCNC: 4.3 G/DL — SIGNIFICANT CHANGE UP (ref 3.5–5.2)
ALP SERPL-CCNC: 63 U/L — SIGNIFICANT CHANGE UP (ref 30–115)
ALT FLD-CCNC: 24 U/L — SIGNIFICANT CHANGE UP (ref 0–41)
ANION GAP SERPL CALC-SCNC: 12 MMOL/L — SIGNIFICANT CHANGE UP (ref 7–14)
AST SERPL-CCNC: 18 U/L — SIGNIFICANT CHANGE UP (ref 0–41)
BILIRUB SERPL-MCNC: 0.2 MG/DL — SIGNIFICANT CHANGE UP (ref 0.2–1.2)
BUN SERPL-MCNC: 20 MG/DL — SIGNIFICANT CHANGE UP (ref 10–20)
CALCIUM SERPL-MCNC: 9.4 MG/DL — SIGNIFICANT CHANGE UP (ref 8.5–10.1)
CHLORIDE SERPL-SCNC: 107 MMOL/L — SIGNIFICANT CHANGE UP (ref 98–110)
CO2 SERPL-SCNC: 24 MMOL/L — SIGNIFICANT CHANGE UP (ref 17–32)
CREAT SERPL-MCNC: 1 MG/DL — SIGNIFICANT CHANGE UP (ref 0.7–1.5)
GLUCOSE SERPL-MCNC: 101 MG/DL — HIGH (ref 70–99)
HCT VFR BLD CALC: 40.4 % — LOW (ref 42–52)
HGB BLD-MCNC: 13.7 G/DL — LOW (ref 14–18)
LIDOCAIN IGE QN: 85 U/L — HIGH (ref 7–60)
MCHC RBC-ENTMCNC: 31.1 PG — HIGH (ref 27–31)
MCHC RBC-ENTMCNC: 33.9 G/DL — SIGNIFICANT CHANGE UP (ref 32–37)
MCV RBC AUTO: 91.6 FL — SIGNIFICANT CHANGE UP (ref 80–94)
NRBC # BLD: 0 /100 WBCS — SIGNIFICANT CHANGE UP (ref 0–0)
PLATELET # BLD AUTO: 241 K/UL — SIGNIFICANT CHANGE UP (ref 130–400)
POTASSIUM SERPL-MCNC: 4.5 MMOL/L — SIGNIFICANT CHANGE UP (ref 3.5–5)
POTASSIUM SERPL-SCNC: 4.5 MMOL/L — SIGNIFICANT CHANGE UP (ref 3.5–5)
PROT SERPL-MCNC: 6.6 G/DL — SIGNIFICANT CHANGE UP (ref 6–8)
RBC # BLD: 4.41 M/UL — LOW (ref 4.7–6.1)
RBC # FLD: 11.7 % — SIGNIFICANT CHANGE UP (ref 11.5–14.5)
SODIUM SERPL-SCNC: 143 MMOL/L — SIGNIFICANT CHANGE UP (ref 135–146)
TROPONIN T SERPL-MCNC: <0.01 NG/ML — SIGNIFICANT CHANGE UP
WBC # BLD: 4.6 K/UL — LOW (ref 4.8–10.8)
WBC # FLD AUTO: 4.6 K/UL — LOW (ref 4.8–10.8)

## 2019-05-10 PROCEDURE — 99285 EMERGENCY DEPT VISIT HI MDM: CPT

## 2019-05-10 PROCEDURE — 71045 X-RAY EXAM CHEST 1 VIEW: CPT | Mod: 26

## 2019-05-10 PROCEDURE — 93010 ELECTROCARDIOGRAM REPORT: CPT

## 2019-05-10 RX ORDER — LISINOPRIL 2.5 MG/1
5 TABLET ORAL DAILY
Refills: 0 | Status: DISCONTINUED | OUTPATIENT
Start: 2019-05-10 | End: 2019-05-12

## 2019-05-10 RX ORDER — ESCITALOPRAM OXALATE 10 MG/1
10 TABLET, FILM COATED ORAL AT BEDTIME
Refills: 0 | Status: DISCONTINUED | OUTPATIENT
Start: 2019-05-10 | End: 2019-05-12

## 2019-05-10 RX ORDER — ASPIRIN/CALCIUM CARB/MAGNESIUM 324 MG
243 TABLET ORAL ONCE
Refills: 0 | Status: COMPLETED | OUTPATIENT
Start: 2019-05-10 | End: 2019-05-10

## 2019-05-10 RX ORDER — CHLORHEXIDINE GLUCONATE 213 G/1000ML
1 SOLUTION TOPICAL
Refills: 0 | Status: DISCONTINUED | OUTPATIENT
Start: 2019-05-10 | End: 2019-05-12

## 2019-05-10 RX ORDER — ENOXAPARIN SODIUM 100 MG/ML
40 INJECTION SUBCUTANEOUS DAILY
Refills: 0 | Status: DISCONTINUED | OUTPATIENT
Start: 2019-05-10 | End: 2019-05-12

## 2019-05-10 RX ORDER — ASPIRIN/CALCIUM CARB/MAGNESIUM 324 MG
81 TABLET ORAL DAILY
Refills: 0 | Status: DISCONTINUED | OUTPATIENT
Start: 2019-05-10 | End: 2019-05-12

## 2019-05-10 RX ORDER — ATORVASTATIN CALCIUM 80 MG/1
40 TABLET, FILM COATED ORAL AT BEDTIME
Refills: 0 | Status: DISCONTINUED | OUTPATIENT
Start: 2019-05-10 | End: 2019-05-12

## 2019-05-10 RX ORDER — METOPROLOL TARTRATE 50 MG
25 TABLET ORAL
Refills: 0 | Status: DISCONTINUED | OUTPATIENT
Start: 2019-05-10 | End: 2019-05-12

## 2019-05-10 RX ORDER — CLOPIDOGREL BISULFATE 75 MG/1
75 TABLET, FILM COATED ORAL DAILY
Refills: 0 | Status: DISCONTINUED | OUTPATIENT
Start: 2019-05-10 | End: 2019-05-12

## 2019-05-10 RX ADMIN — ESCITALOPRAM OXALATE 10 MILLIGRAM(S): 10 TABLET, FILM COATED ORAL at 21:38

## 2019-05-10 RX ADMIN — Medication 243 MILLIGRAM(S): at 15:35

## 2019-05-10 RX ADMIN — ENOXAPARIN SODIUM 40 MILLIGRAM(S): 100 INJECTION SUBCUTANEOUS at 21:38

## 2019-05-10 RX ADMIN — Medication 25 MILLIGRAM(S): at 18:51

## 2019-05-10 RX ADMIN — ATORVASTATIN CALCIUM 40 MILLIGRAM(S): 80 TABLET, FILM COATED ORAL at 21:38

## 2019-05-10 NOTE — ED PROVIDER NOTE - CLINICAL SUMMARY MEDICAL DECISION MAKING FREE TEXT BOX
pt with extensive cardiac history including multiple stents presents with left sided chest pain.  Pain was worse prior to coming to the ED and pain had improved by the time he came to ED.  no nausea, no vomiting.  NO ABD PAIN.  lungs clear, abd soft and nontender.  trop negative.  Pt given aspirin and admitted to tele for further management.

## 2019-05-10 NOTE — H&P ADULT - NSHPSOCIALHISTORY_GEN_ALL_CORE
former smoker, no alcohol or illicit drug use former smoker, occasional alcohol but denies illicit drug use

## 2019-05-10 NOTE — H&P ADULT - NSHPLABSRESULTS_GEN_ALL_CORE
LABS:                        13.7   4.60  )-----------( 241      ( 10 May 2019 14:15 )             40.4     10 May 2019 14:15    143    |  107    |  20     ----------------------------<  101    4.5     |  24     |  1.0      Ca    9.4        10 May 2019 14:15    TPro  6.6    /  Alb  4.3    /  TBili  0.2    /  DBili  x      /  AST  18     /  ALT  24     /  AlkPhos  63     10 May 2019 14:15    echo < from: Transthoracic Echocardiogram (02.10.19 @ 17:28) >    1. LV Ejection Fraction by Coon's Method with a biplane EF of 63 %.   2. Spectral Doppler shows impaired relaxation pattern of left   ventricular myocardial filling (Grade I diastolic dysfunction).   3. Mild tricuspid regurgitation.    < end of copied text >    < from: 12 Lead ECG (05.10.19 @ 13:20) >    Normal sinus rhythm  Normal ECG    < end of copied text >    < from: Cardiac Cath Lab - Adult (02.12.19 @ 12:28) >    A successful drug-eluting stent was performed on the    80 % lesion in the distal anastomosis of the free right internal mammary    graft from the aorta to the 1st obtuse marginal. Following intervention    there was an excellent angiographic appearance with a 0 % residual    stenosis. This was not a bifurcation lesion. This was an ACC/AHA type C    "high risk" lesion for intervention. There was no evidence of the    transient no-reflow phenomenon. The residual lesion was tubular. There was    SUMAN 3 flow before the procedure and SUMAN 3 flow after the procedure.    There were no site complications. There was no perforation. There was no    dissection.         --  Vessel setup was performed. A AL.75 Launcher guiding catheter was used    to intubate the vessel. A Runthrough NS 180cm wire was used to cross the    lesion in the 1st obtuse marginal.         --  A 3.5 X 12 Anjel MAEVE RX drug-eluting stent was placed across the lesion    and deployed. The resulting stenosis was 0 %.    < end of copied text >

## 2019-05-10 NOTE — H&P ADULT - NSICDXPASTMEDICALHX_GEN_ALL_CORE_FT
PAST MEDICAL HISTORY:  Coronary artery disease     High cholesterol     Hypertension, unspecified type

## 2019-05-10 NOTE — H&P ADULT - ATTENDING COMMENTS
Agree with above plan. Pain now resolving 3/10 in intensity. No tele events, EKG NSR, troponins normal. Spoke with cardiology Dr. Garza. He will evaluate patient, likely needs stress test.

## 2019-05-10 NOTE — H&P ADULT - HISTORY OF PRESENT ILLNESS
49 y/o male with h/o CAD s/p CABG ( 4 years ago) , CAD s/p PCI ( march 2018- 3 stents f/b 1 stent to EDUIN to OM anastomosis with 3.5 x 12 MAEVE in feb 2019 ) HTN , depression presented to ED with c/o Chest pain 49 y/o male with h/o CAD s/p CABG ( 4 years ago) , CAD s/p PCI ( march 2018- 3 stents f/b 1 stent to EDUIN to OM anastomosis with 3.5 x 12 MAEVE in feb 2019 ) HTN , depression presented to ED with c/o Chest pain   chest pain described as dull ,localized to left side ,radiating to back, started at around 9:30 am when patient was sitting at home , initially 7/10 intensity and lasted for 30 minutes. a/w SOB but denies nausea , diaphoresis , headache, abdominal pain, fever or chills. Patient states pain is similar to the pain he had when had to undergo PCI twice .States pain is now 1/10 and constant and still radiating to back .He has been compliant with his medications including DAPT  . No recent travel , trauma , fall .   VS ed 134/77 ,hr 77 temp 97.7   Rx ED

## 2019-05-10 NOTE — ED PROVIDER NOTE - NS ED ROS FT
Constitutional:  no fevers, no chills, no malaise  ENMT: No neck pain or stiffness, no nasal congestion, no ear pain, no throat pain  Cardiac:  see hpi  Respiratory:  No cough or sob  GI:  No nausea, vomiting, diarrhea or abdominal pain.  MS:  No back pain, no joint pain.  Neuro:  No headache, no dizziness, no change in mental status  Skin:  No skin rash  Except as documented in the HPI,  all other systems are negative

## 2019-05-10 NOTE — ED ADULT NURSE NOTE - OBJECTIVE STATEMENT
patient states he had mid-sternal chest pain earlier that radiated to his back. denies chest pain now. denies sob

## 2019-05-10 NOTE — H&P ADULT - NSICDXFAMILYHX_GEN_ALL_CORE_FT
FAMILY HISTORY:  Father  Still living? Yes, Estimated age: Age Unknown  Family history of coronary artery disease in brother, Age at diagnosis: Age Unknown    Sibling  Still living? Unknown  Family history of coronary artery disease in brother, Age at diagnosis: Age Unknown

## 2019-05-10 NOTE — H&P ADULT - ASSESSMENT
49 y/o male with h/o CAD s/p CABG ( 4 years ago) , CAD s/p PCI ( march 2018- 3 stents f/b 1 stent to EDUIN to OM anastomosis with 3.5 x 12 MAEVE in feb 2019 ) HTN , depression presented to ED with c/o Chest pain     #chest pain- considering extensive cardiac history r/o ACS  -admit to telemetry   - first set CE negative repeat CE at 20:00  - ekg NSR  - patient states compliant with DAPT - c/w DAPT ASA and plavix  -f/u cardio recs- dr cote    #HTN  #depression- c/w lexapro  #dvt ppx  #full code, from home 47 y/o male with h/o CAD s/p CABG ( 4 years ago) , CAD s/p PCI ( march 2018- 3 stents f/b 1 stent to EDUIN to OM anastomosis with 3.5 x 12 MAEVE in feb 2019 ) HTN , depression presented to ED with c/o Chest pain     #chest pain- considering extensive cardiac history r/o ACS  -admit to telemetry   - first set CE negative repeat CE at 20:00  - ekg NSR  - patient states compliant with DAPT - c/w DAPT ASA and plavix  -f/u cardio recs- dr cote  -c/w lipitor,lisinopril and metoprolol   -might benefit from stress test-f/u cardio recs    #HTN- stable, c/w lisinopril  #mildly elevated lipase- denies abdominal pain , h/o occasional beer intake,last drink yesterday. Doubt pancreatitis   #depression- c/w lexapro  #dvt ppx-lovenox  #full code, from home

## 2019-05-10 NOTE — ED PROVIDER NOTE - PHYSICAL EXAMINATION
CONSTITUTIONAL: Well-developed; well-nourished; in no acute distress, nontoxic appearing  SKIN: skin exam is warm and dry,  HEAD: Normocephalic; atraumatic.  EYES: conjunctiva and sclera clear.  ENT: MMM, no nasal congestion  NECK: Supple; non tender.  ROM intact.  CARD: S1, S2 normal, no murmur  RESP: No wheezes, rales or rhonchi. Good air movement bilaterally  ABD: soft; non-distended; non-tender. No Rebound, No guarding  EXT: Normal ROM. No cyanosis  NEURO: awake, alert, following commands, oriented, grossly unremarkable. No Focal deficits. GCS 15.   PSYCH: Cooperative, appropriate.

## 2019-05-10 NOTE — H&P ADULT - NSHPPHYSICALEXAM_GEN_ALL_CORE
General: NAD, AAO x3  HEENT: No icterus,. Moist mucous membranes  Neck: No JVD noted. Supple  Cardio: S1, S2 noted, RRR. No murmurs  Resp: Clear to auscultation b/l. No adventitious sounds  Abdo: Soft, NT, bowel sounds present.   Extremities: No edema noted. Pulses present b/l  Neuro: AAO x3, grossly normal motor strength.  Skin: Dry, no rashes

## 2019-05-10 NOTE — ED PROVIDER NOTE - OBJECTIVE STATEMENT
47 yo m with pmh of cad, htn, s[p multilpe stents and cabg presents with chest pain intermittent radiating to left shoulder starting today.  no current chest pain.  no nausea, no vomiting, no back pain, no numbness, no weakness, no dizziness, no fevers, no chills.  no leg pain 47 yo m with pmh of cad, htn, s[p multilpe stents and cabg presents with chest pain intermittent radiating to left shoulder starting today.  no current chest pain.  no nausea, no vomiting, no back pain, no numbness, no weakness, no dizziness, no fevers, no chills.  no leg pain.  Pt has no abd pain.

## 2019-05-11 DIAGNOSIS — R07.9 CHEST PAIN, UNSPECIFIED: ICD-10-CM

## 2019-05-11 DIAGNOSIS — I10 ESSENTIAL (PRIMARY) HYPERTENSION: ICD-10-CM

## 2019-05-11 DIAGNOSIS — E78.00 PURE HYPERCHOLESTEROLEMIA, UNSPECIFIED: ICD-10-CM

## 2019-05-11 DIAGNOSIS — I25.10 ATHEROSCLEROTIC HEART DISEASE OF NATIVE CORONARY ARTERY WITHOUT ANGINA PECTORIS: ICD-10-CM

## 2019-05-11 LAB
ALBUMIN SERPL ELPH-MCNC: 4.2 G/DL — SIGNIFICANT CHANGE UP (ref 3.5–5.2)
ALP SERPL-CCNC: 53 U/L — SIGNIFICANT CHANGE UP (ref 30–115)
ALT FLD-CCNC: 23 U/L — SIGNIFICANT CHANGE UP (ref 0–41)
ANION GAP SERPL CALC-SCNC: 10 MMOL/L — SIGNIFICANT CHANGE UP (ref 7–14)
AST SERPL-CCNC: 17 U/L — SIGNIFICANT CHANGE UP (ref 0–41)
BASOPHILS # BLD AUTO: 0.03 K/UL — SIGNIFICANT CHANGE UP (ref 0–0.2)
BASOPHILS NFR BLD AUTO: 0.6 % — SIGNIFICANT CHANGE UP (ref 0–1)
BILIRUB SERPL-MCNC: 0.5 MG/DL — SIGNIFICANT CHANGE UP (ref 0.2–1.2)
BUN SERPL-MCNC: 18 MG/DL — SIGNIFICANT CHANGE UP (ref 10–20)
CALCIUM SERPL-MCNC: 9.3 MG/DL — SIGNIFICANT CHANGE UP (ref 8.5–10.1)
CHLORIDE SERPL-SCNC: 107 MMOL/L — SIGNIFICANT CHANGE UP (ref 98–110)
CO2 SERPL-SCNC: 25 MMOL/L — SIGNIFICANT CHANGE UP (ref 17–32)
CREAT SERPL-MCNC: 1.1 MG/DL — SIGNIFICANT CHANGE UP (ref 0.7–1.5)
EOSINOPHIL # BLD AUTO: 0.24 K/UL — SIGNIFICANT CHANGE UP (ref 0–0.7)
EOSINOPHIL NFR BLD AUTO: 4.8 % — SIGNIFICANT CHANGE UP (ref 0–8)
ESTIMATED AVERAGE GLUCOSE: 108 MG/DL — SIGNIFICANT CHANGE UP (ref 68–114)
GLUCOSE SERPL-MCNC: 103 MG/DL — HIGH (ref 70–99)
HBA1C BLD-MCNC: 5.4 % — SIGNIFICANT CHANGE UP (ref 4–5.6)
HCT VFR BLD CALC: 40.7 % — LOW (ref 42–52)
HGB BLD-MCNC: 13.8 G/DL — LOW (ref 14–18)
IMM GRANULOCYTES NFR BLD AUTO: 0.6 % — HIGH (ref 0.1–0.3)
LYMPHOCYTES # BLD AUTO: 1.43 K/UL — SIGNIFICANT CHANGE UP (ref 1.2–3.4)
LYMPHOCYTES # BLD AUTO: 28.9 % — SIGNIFICANT CHANGE UP (ref 20.5–51.1)
MCHC RBC-ENTMCNC: 31.2 PG — HIGH (ref 27–31)
MCHC RBC-ENTMCNC: 33.9 G/DL — SIGNIFICANT CHANGE UP (ref 32–37)
MCV RBC AUTO: 92.1 FL — SIGNIFICANT CHANGE UP (ref 80–94)
MONOCYTES # BLD AUTO: 0.62 K/UL — HIGH (ref 0.1–0.6)
MONOCYTES NFR BLD AUTO: 12.5 % — HIGH (ref 1.7–9.3)
NEUTROPHILS # BLD AUTO: 2.6 K/UL — SIGNIFICANT CHANGE UP (ref 1.4–6.5)
NEUTROPHILS NFR BLD AUTO: 52.6 % — SIGNIFICANT CHANGE UP (ref 42.2–75.2)
NRBC # BLD: 0 /100 WBCS — SIGNIFICANT CHANGE UP (ref 0–0)
PLATELET # BLD AUTO: 226 K/UL — SIGNIFICANT CHANGE UP (ref 130–400)
POTASSIUM SERPL-MCNC: 4.1 MMOL/L — SIGNIFICANT CHANGE UP (ref 3.5–5)
POTASSIUM SERPL-SCNC: 4.1 MMOL/L — SIGNIFICANT CHANGE UP (ref 3.5–5)
PROT SERPL-MCNC: 6.5 G/DL — SIGNIFICANT CHANGE UP (ref 6–8)
RBC # BLD: 4.42 M/UL — LOW (ref 4.7–6.1)
RBC # FLD: 11.8 % — SIGNIFICANT CHANGE UP (ref 11.5–14.5)
SODIUM SERPL-SCNC: 142 MMOL/L — SIGNIFICANT CHANGE UP (ref 135–146)
TROPONIN T SERPL-MCNC: <0.01 NG/ML — SIGNIFICANT CHANGE UP
WBC # BLD: 4.95 K/UL — SIGNIFICANT CHANGE UP (ref 4.8–10.8)
WBC # FLD AUTO: 4.95 K/UL — SIGNIFICANT CHANGE UP (ref 4.8–10.8)

## 2019-05-11 RX ORDER — ADENOSINE 3 MG/ML
60 INJECTION INTRAVENOUS ONCE
Refills: 0 | Status: DISCONTINUED | OUTPATIENT
Start: 2019-05-11 | End: 2019-05-11

## 2019-05-11 RX ADMIN — LISINOPRIL 5 MILLIGRAM(S): 2.5 TABLET ORAL at 05:48

## 2019-05-11 RX ADMIN — ATORVASTATIN CALCIUM 40 MILLIGRAM(S): 80 TABLET, FILM COATED ORAL at 21:39

## 2019-05-11 RX ADMIN — Medication 25 MILLIGRAM(S): at 18:42

## 2019-05-11 RX ADMIN — ESCITALOPRAM OXALATE 10 MILLIGRAM(S): 10 TABLET, FILM COATED ORAL at 21:39

## 2019-05-11 RX ADMIN — Medication 25 MILLIGRAM(S): at 05:48

## 2019-05-11 RX ADMIN — Medication 81 MILLIGRAM(S): at 12:43

## 2019-05-11 RX ADMIN — CLOPIDOGREL BISULFATE 75 MILLIGRAM(S): 75 TABLET, FILM COATED ORAL at 12:43

## 2019-05-11 RX ADMIN — ENOXAPARIN SODIUM 40 MILLIGRAM(S): 100 INJECTION SUBCUTANEOUS at 12:44

## 2019-05-11 NOTE — CONSULT NOTE ADULT - PROBLEM SELECTOR RECOMMENDATION 9
pt with same type of cp he had prior to needing intervention, cp 30 misn, nl ekg and neg ce's  pt for an exercise stress nuclear.

## 2019-05-11 NOTE — CONSULT NOTE ADULT - SUBJECTIVE AND OBJECTIVE BOX
Patient is a 48y old  Male who presents with a chief complaint of chest pain r/o ACS (10 May 2019 16:31)      HPI:  47 y/o male with h/o CAD s/p CABG ( 4 years ago) , CAD s/p PCI ( march 2018- 3 stents f/b 1 stent to EDUIN to OM anastomosis with 3.5 x 12 MAEVE in feb 2019 ) HTN , depression presented to ED with c/o Chest pain   chest pain described as dull ,localized to left side ,radiating to back, started at around 9:30 am yesterday .  pt had been at work and did and oil change and was moving some equipment.  pain  initially 7/10 intensity and lasted for 30 minutes. a/w SOB but denies nausea , diaphoresis , headache, abdominal pain, fever or chills. Patient states pain is similar to the pain he had when had to undergo PCI twice .States pain is now resolved.  He has been compliant with his medications including DAPT  . No recent travel , trauma , fall .   VS ed 134/77 ,hr 77 temp 97.7   Rx ED  (10 May 2019 16:31)      PAST MEDICAL & SURGICAL HISTORY:  Coronary artery disease  High cholesterol  Hypertension, unspecified type  History of quadruple bypass  History of appendectomy      PREVIOUS DIAGNOSTIC TESTING:      ECHO< from: Transthoracic Echocardiogram (02.10.19 @ 17:28) >  Summary:   1. LV Ejection Fraction by Coon's Method with a biplane EF of 63 %.   2. Spectral Doppler shows impaired relaxation pattern of left   ventricular myocardial filling (Grade I diastolic dysfunction).   3. Mild tricuspid regurgitation.      MEDICATIONS  (STANDING):  adenosine Injectable (ADENOSCAN) 60 milliGRAM(s) IV Bolus once  aspirin  chewable 81 milliGRAM(s) Oral daily  atorvastatin 40 milliGRAM(s) Oral at bedtime  chlorhexidine 4% Liquid 1 Application(s) Topical <User Schedule>  clopidogrel Tablet 75 milliGRAM(s) Oral daily  enoxaparin Injectable 40 milliGRAM(s) SubCutaneous daily  escitalopram 10 milliGRAM(s) Oral at bedtime  lisinopril 5 milliGRAM(s) Oral daily  metoprolol tartrate 25 milliGRAM(s) Oral two times a day    MEDICATIONS  (PRN):      FAMILY HISTORY:  Family history of coronary artery disease in brother (Father, Sibling)      SOCIAL HISTORY:  CIGARETTES: none  ALCOHOL: none  DRUGS: none                      REVIEW OF SYSTEMS:  CONSTITUTIONAL: No distress, Looks stable  NECK: No pain  RESPIRATORY: No cough, wheezing, shortness of breath  CARDIOVASCULAR: No chest pain, SOB, palpitations, leg swelling  GASTROINTESTINAL: No abdominal or epigastric pain. No nausea, vomiting, or hematemesis;  No melena.  NEUROLOGICAL: No dizziness, headaches, memory loss, loss of strength  SKIN: No itching, burning, rashes, or lesions   ENDOCRINE: No heat or cold intolerance  MUSCULOSKELETAL: No joint pain, No  swelling; No muscle pain  PSYCHIATRIC: No depression, anxiety, mood swings, or difficulty sleeping  ALLERGY: No hives, itching, rash          Vital Signs Last 24 Hrs  T(C): 36.2 (11 May 2019 13:45), Max: 36.9 (10 May 2019 20:02)  T(F): 97.2 (11 May 2019 13:45), Max: 98.4 (10 May 2019 20:02)  HR: 63 (11 May 2019 13:45) (62 - 69)  BP: 116/64 (11 May 2019 13:45) (116/64 - 136/71)  BP(mean): --  RR: 18 (11 May 2019 13:45) (18 - 18)  SpO2: 97% (10 May 2019 19:50) (97% - 97%)                      PHYSICAL EXAM:  GENERAL: No distress, well developed  HEAD:  Atraumatic, Normocephalic  NECK: Supple, No JVD, No Bruit of either carotid arteries  NERVOUS SYSTEM:  Alert, Awake, Oriented to time, place, person; Normal memory and speech; Normal motor Strength 5/5 B/L upper and lower extremities  CHEST/LUNG: Normal air entry to lung base bilaterally; No wheeze, crackle, rales, rhonchi  HEART: Regular heart beat, S1, A2, P2, No S3, No S4, No gallop, No murmur  ABDOMEN: Soft, Non tender, Non distended; Bowel sounds present  EXTREMITIES:  2+ Peripheral Pulses, No clubbing, No edema  SKIN: No rashes or lesions    TELEMETRY: nsr    ECG:< from: 12 Lead ECG (05.10.19 @ 13:20) >  Diagnosis Line Normal sinus rhythm  Normal ECG    < end of copied text >      I&O's Detail    10 May 2019 07:01  -  11 May 2019 07:00  --------------------------------------------------------  IN:    Oral Fluid: 360 mL  Total IN: 360 mL    OUT:    Voided: 400 mL  Total OUT: 400 mL    Total NET: -40 mL          LABS:                        13.8   4.95  )-----------( 226      ( 11 May 2019 07:23 )             40.7     05-11    142  |  107  |  18  ----------------------------<  103<H>  4.1   |  25  |  1.1    Ca    9.3      11 May 2019 07:23    TPro  6.5  /  Alb  4.2  /  TBili  0.5  /  DBili  x   /  AST  17  /  ALT  23  /  AlkPhos  53  05-11    CARDIAC MARKERS ( 10 May 2019 23:41 )  x     / <0.01 ng/mL / x     / x     / x      CARDIAC MARKERS ( 10 May 2019 14:15 )  x     / <0.01 ng/mL / x     / x     / x              I&O's Summary    10 May 2019 07:01  -  11 May 2019 07:00  --------------------------------------------------------  IN: 360 mL / OUT: 400 mL / NET: -40 mL        RADIOLOGY & ADDITIONAL STUDIES:

## 2019-05-12 ENCOUNTER — TRANSCRIPTION ENCOUNTER (OUTPATIENT)
Age: 49
End: 2019-05-12

## 2019-05-12 VITALS
HEART RATE: 69 BPM | TEMPERATURE: 96 F | SYSTOLIC BLOOD PRESSURE: 119 MMHG | DIASTOLIC BLOOD PRESSURE: 56 MMHG | RESPIRATION RATE: 18 BRPM

## 2019-05-12 PROCEDURE — 93016 CV STRESS TEST SUPVJ ONLY: CPT

## 2019-05-12 PROCEDURE — 78452 HT MUSCLE IMAGE SPECT MULT: CPT | Mod: 26

## 2019-05-12 PROCEDURE — 93018 CV STRESS TEST I&R ONLY: CPT

## 2019-05-12 RX ORDER — ADENOSINE 3 MG/ML
60 INJECTION INTRAVENOUS ONCE
Refills: 0 | Status: COMPLETED | OUTPATIENT
Start: 2019-05-12 | End: 2019-05-12

## 2019-05-12 RX ADMIN — ADENOSINE 600 MILLIGRAM(S): 3 INJECTION INTRAVENOUS at 16:36

## 2019-05-12 RX ADMIN — Medication 25 MILLIGRAM(S): at 06:10

## 2019-05-12 RX ADMIN — Medication 81 MILLIGRAM(S): at 12:34

## 2019-05-12 RX ADMIN — CLOPIDOGREL BISULFATE 75 MILLIGRAM(S): 75 TABLET, FILM COATED ORAL at 12:34

## 2019-05-12 RX ADMIN — ENOXAPARIN SODIUM 40 MILLIGRAM(S): 100 INJECTION SUBCUTANEOUS at 12:34

## 2019-05-12 RX ADMIN — LISINOPRIL 5 MILLIGRAM(S): 2.5 TABLET ORAL at 06:10

## 2019-05-12 NOTE — PROGRESS NOTE ADULT - SUBJECTIVE AND OBJECTIVE BOX
CHIEF COMPLAINT:    Patient is a 48y old  Male who presents with a chief complaint of chest pain r/o ACS (12 May 2019 12:18)      INTERVAL HPI/OVERNIGHT EVENTS:    Patient seen and examined at bedside. No acute overnight events occurred.    ROS: All other systems are negative.    Vital Signs:    T(F): 96.4 (05-12-19 @ 12:50), Max: 97.2 (05-11-19 @ 13:45)  HR: 69 (05-12-19 @ 12:50) (60 - 69)  BP: 119/56 (05-12-19 @ 12:50) (116/64 - 121/75)  RR: 18 (05-12-19 @ 12:50) (18 - 20)  SpO2: --  I&O's Summary      PHYSICAL EXAM:  GENERAL:  NAD  SKIN: No rashes or lesions  HEENT: Atraumatic. Normocephalic. Anicteric  NECK:  No JVD.   PULMONARY: Clear to ausculation bilaterally. No wheezing. No rales  CVS: Normal S1, S2. Regular rate and rhythm. No murmurs.  ABDOMEN/GI: Soft, Nontender, Nondistended; Bowel sounds are present  EXTREMITIES:  No edema B/L LE.  NEUROLOGIC:  No motor deficit.  PSYCH: Alert & oriented x 3, normal affect    Consultant(s) Notes Reviewed:  [x ] YES  [ ] NO    LABS:                        13.8   4.95  )-----------( 226      ( 11 May 2019 07:23 )             40.7     05-11    142  |  107  |  18  ----------------------------<  103<H>  4.1   |  25  |  1.1    Ca    9.3      11 May 2019 07:23    TPro  6.5  /  Alb  4.2  /  TBili  0.5  /  DBili  x   /  AST  17  /  ALT  23  /  AlkPhos  53  05-11        Trop <0.01, CKMB --, CK --, 05-10-19 @ 23:41  Trop <0.01, CKMB --, CK --, 05-10-19 @ 14:15        RADIOLOGY & ADDITIONAL TESTS:  Stress test positive for small reversible defect    Telemetry reviewed independently-no events    Medications:  Standing  adenosine Injectable (ADENOSCAN) 60 milliGRAM(s) IV Bolus once  aspirin  chewable 81 milliGRAM(s) Oral daily  atorvastatin 40 milliGRAM(s) Oral at bedtime  chlorhexidine 4% Liquid 1 Application(s) Topical <User Schedule>  clopidogrel Tablet 75 milliGRAM(s) Oral daily  enoxaparin Injectable 40 milliGRAM(s) SubCutaneous daily  escitalopram 10 milliGRAM(s) Oral at bedtime  lisinopril 5 milliGRAM(s) Oral daily  metoprolol tartrate 25 milliGRAM(s) Oral two times a day    PRN Meds
Subjective: pt feels well with no cp nor sob.       MEDICATIONS  (STANDING):  adenosine Injectable (ADENOSCAN) 60 milliGRAM(s) IV Bolus once  aspirin  chewable 81 milliGRAM(s) Oral daily  atorvastatin 40 milliGRAM(s) Oral at bedtime  chlorhexidine 4% Liquid 1 Application(s) Topical <User Schedule>  clopidogrel Tablet 75 milliGRAM(s) Oral daily  enoxaparin Injectable 40 milliGRAM(s) SubCutaneous daily  escitalopram 10 milliGRAM(s) Oral at bedtime  lisinopril 5 milliGRAM(s) Oral daily  metoprolol tartrate 25 milliGRAM(s) Oral two times a day    MEDICATIONS  (PRN):            Vital Signs Last 24 Hrs  T(C): 35.7 (12 May 2019 05:46), Max: 36.2 (11 May 2019 13:45)  T(F): 96.2 (12 May 2019 05:46), Max: 97.2 (11 May 2019 13:45)  HR: 62 (12 May 2019 05:46) (60 - 63)  BP: 119/62 (12 May 2019 05:46) (116/64 - 121/75)  BP(mean): --  RR: 18 (12 May 2019 05:46) (18 - 20)  SpO2: --             REVIEW OF SYSTEMS:  CONSTITUTIONAL: no fever, no chills, no diaphoresis  CARDIOLOGY: no chest pain, no SOB, no palpitation, no diaphoresis, no faint   RESPIRATORY: no dyspnea, no wheeze, no orthopnea, no PND   NEUROLOGICAL: no dizziness, headache, focal deficits to report.  GI: no abdominal pain, no dyspepsia, no nausea, no vomiting, no diarrhea.    HEENT: no congestion, no nasal bleeding  SKIN: no ecchymosis, no ptechia             PHYSICAL EXAM:  · CONSTITUTIONAL: Looks stable  . NECK: Supple, no JVD, no bruit on either carotid side   · RESPIRATORY: Normal air entry to lung base, no wheeze, no crackle, no wet rales  · CARDIOVASCULAR: Normal S1, A2, P2, no murmur, no click, regular rate,  no rub,  · EXTREMITIES: No cyanosis, no clubbing, no edema  · VASCULAR: Pulses are regular, equal, bilateral in upper and lower extremities  	  TELEMETRY:nsr      LABS:                        13.8   4.95  )-----------( 226      ( 11 May 2019 07:23 )             40.7     05-11    142  |  107  |  18  ----------------------------<  103<H>  4.1   |  25  |  1.1    Ca    9.3      11 May 2019 07:23    TPro  6.5  /  Alb  4.2  /  TBili  0.5  /  DBili  x   /  AST  17  /  ALT  23  /  AlkPhos  53  05-11    CARDIAC MARKERS ( 10 May 2019 23:41 )  x     / <0.01 ng/mL / x     / x     / x      CARDIAC MARKERS ( 10 May 2019 14:15 )  x     / <0.01 ng/mL / x     / x     / x              I&O's Summary    BNP  RADIOLOGY & ADDITIONAL STUDIES:    IMPRESSION AND PLAN:

## 2019-05-12 NOTE — PROGRESS NOTE ADULT - PROBLEM SELECTOR PLAN 1
pt had an exercise nuclear stress and went 11 mins with out sxs but because he did not meet hr > 85% an adenosisne was done and awiting spect images.  if spect is nl then no further inpt cardiac w/u.

## 2019-05-12 NOTE — DISCHARGE NOTE PROVIDER - HOSPITAL COURSE
47 yo M with CAD s/p CABG and stents presents for evaluation of chest pain. Nuclear stress test showed small reversible are of ischemia in lateral wall, may be artifactual. Chest pain resolved.

## 2019-05-12 NOTE — PROGRESS NOTE ADULT - ASSESSMENT
47 y/o male with h/o CAD s/p CABG ( 4 years ago) , CAD s/p PCI ( march 2018- 3 stents f/b 1 stent to EDUIN to OM anastomosis with 3.5 x 12 MAEVE in feb 2019 ) HTN , depression presented to ED with c/o Chest pain     Chest pain  -stress test shows reversible ischemia  -awaiting cardiology follow up, pt asymptomatic during testing, likely not significant finding  -medical mgmt  -follow up with Dr. Ruiz   c/w DAPT ASA and plavix  -c/w lipitor,lisinopril and metoprolol     HTN  - stable, c/w lisinopril    Depression  - c/w lexapro    #dvt ppx-lovenox  #full code, from home    #Progress Note Handoff:  Pending: cardiology consult  Family discussion: d/w pt at bedside  Disposition: Home_x__/SNF___/Other________/Unknown at this time________
Addendum. nuclear stress test showed a possible small inferior ischemia vs diaphragmatic artifact.  pt exercised for 11 mins with no cp, ekg changes nor malignant arrhythmias.  pt had a good prognostic stress test.  pt did not have sxs on the treadmill that brought the pt ot the er and thus the sxs were not cardiac in origin.  pt may be d/c to home on current meds and f/u with Dr. Ruiz in 2 weeks in the office,

## 2019-05-12 NOTE — DISCHARGE NOTE NURSING/CASE MANAGEMENT/SOCIAL WORK - NSDCDPATPORTLINK_GEN_ALL_CORE
You can access the [facility] Patient Portal, offered by [facility], by registering with the following website: [email or web address]

## 2019-05-12 NOTE — DISCHARGE NOTE PROVIDER - NSDCCPCAREPLAN_GEN_ALL_CORE_FT
PRINCIPAL DISCHARGE DIAGNOSIS  Diagnosis: Chest pain  Assessment and Plan of Treatment: Nuclear stress test shows small reversible ischemia. c/w current management and follow up with cardiology as outpatient

## 2019-05-12 NOTE — DISCHARGE NOTE PROVIDER - CARE PROVIDER_API CALL
Ayaz Ruiz)  Cardiovascular Disease; Interventional Cardiology  [address] [place] [address]  Phone: [phone]  Fax: [phone]  Follow Up Time: 1 week

## 2019-05-15 DIAGNOSIS — I25.10 ATHEROSCLEROTIC HEART DISEASE OF NATIVE CORONARY ARTERY WITHOUT ANGINA PECTORIS: ICD-10-CM

## 2019-05-15 DIAGNOSIS — R07.9 CHEST PAIN, UNSPECIFIED: ICD-10-CM

## 2019-05-15 DIAGNOSIS — F32.9 MAJOR DEPRESSIVE DISORDER, SINGLE EPISODE, UNSPECIFIED: ICD-10-CM

## 2019-05-15 DIAGNOSIS — Z95.1 PRESENCE OF AORTOCORONARY BYPASS GRAFT: ICD-10-CM

## 2019-05-15 DIAGNOSIS — Z95.5 PRESENCE OF CORONARY ANGIOPLASTY IMPLANT AND GRAFT: ICD-10-CM

## 2019-05-15 DIAGNOSIS — E78.5 HYPERLIPIDEMIA, UNSPECIFIED: ICD-10-CM

## 2019-05-15 DIAGNOSIS — Z79.02 LONG TERM (CURRENT) USE OF ANTITHROMBOTICS/ANTIPLATELETS: ICD-10-CM

## 2019-05-15 DIAGNOSIS — Z82.49 FAMILY HISTORY OF ISCHEMIC HEART DISEASE AND OTHER DISEASES OF THE CIRCULATORY SYSTEM: ICD-10-CM

## 2019-05-15 DIAGNOSIS — Z79.82 LONG TERM (CURRENT) USE OF ASPIRIN: ICD-10-CM

## 2019-05-15 DIAGNOSIS — I10 ESSENTIAL (PRIMARY) HYPERTENSION: ICD-10-CM

## 2019-09-05 NOTE — PROGRESS NOTE ADULT - SUBJECTIVE AND OBJECTIVE BOX
MIGDALIA FITZPATRICK [id number]    Hospitalist Note  47yo M with Past Medical History HTN, Depression, CAD status post CABG and PCI x3 in March 2018 admitted for chest pain secondary to NSTEMI.     Overnight events/Updates: The patient underwent cardiac catheterization this afternoon and underwent stent placement to the EDUIN to OM anastomosis.  No new complaints    Vital Signs Last 24 Hrs  T(C): 35.8 (12 Feb 2019 05:42), Max: 35.9 (11 Feb 2019 20:40)  T(F): 96.5 (12 Feb 2019 05:42), Max: 96.7 (11 Feb 2019 20:40)  HR: 72 (12 Feb 2019 05:42) (62 - 72)  BP: 110/64 (12 Feb 2019 05:42) (107/67 - 110/64)  BP(mean): --  RR: 18 (12 Feb 2019 05:42) (18 - 18)  SpO2: --    Physical Examination:  General: AAO x 3  HEENT: PERRLA, EOMI  CV= S1 & S2 appreciated  Lungs=CTA BL  Abdominal Examination= + BS, Soft, NT/ND  Extremity Examination= No C/C/E    ROS: No chest pain, no shortness of breath.  All other systems reviewed and are within normal limits except for the complaints in the HPI.    MEDICATIONS  (STANDING):  aspirin  chewable 81 milliGRAM(s) Oral daily  atorvastatin 40 milliGRAM(s) Oral at bedtime  chlorhexidine 4% Liquid 1 Application(s) Topical <User Schedule>  clopidogrel Tablet 75 milliGRAM(s) Oral daily  enoxaparin Injectable 40 milliGRAM(s) SubCutaneous daily  escitalopram 10 milliGRAM(s) Oral daily  lisinopril 5 milliGRAM(s) Oral daily  metoprolol tartrate 25 milliGRAM(s) Oral two times a day  sodium chloride 0.9%. 1000 milliLiter(s) (100 mL/Hr) IV Continuous <Continuous>    MEDICATIONS  (PRN):                            14.2   6.06  )-----------( 247      ( 12 Feb 2019 05:25 )             41.9     02-12    141  |  100  |  19  ----------------------------<  89  4.3   |  26  |  1.1    Ca    9.7      12 Feb 2019 05:25  Mg     1.9     02-12    TPro  6.6  /  Alb  4.1  /  TBili  0.3  /  DBili  x   /  AST  27  /  ALT  43<H>  /  AlkPhos  60  02-12      Case discussed with housestaff & family  GLENYS Gaxiola [id number] oral

## 2021-11-24 NOTE — PATIENT PROFILE ADULT. - AS SC BRADEN MOISTURE
[FreeTextEntry1] : This note was written by Olga Charles on 11/22/2021 acting solely as a scribe for Dr. Ayo Garcia.\par \par All medical record entries made by the Scribe were at my, Dr. Ayo Garcia, direction and personally dictated by me on 11/22/2021. I have personally reviewed the chart and agree that the record accurately reflects my personal performance of the history, physical exam, assessment and plan. 
(4) rarely moist

## 2022-07-24 ENCOUNTER — INPATIENT (INPATIENT)
Facility: HOSPITAL | Age: 52
LOS: 1 days | Discharge: HOME | End: 2022-07-26
Attending: INTERNAL MEDICINE | Admitting: INTERNAL MEDICINE

## 2022-07-24 VITALS
TEMPERATURE: 98 F | DIASTOLIC BLOOD PRESSURE: 93 MMHG | OXYGEN SATURATION: 100 % | HEIGHT: 71 IN | RESPIRATION RATE: 82 BRPM | WEIGHT: 244.93 LBS | SYSTOLIC BLOOD PRESSURE: 186 MMHG

## 2022-07-24 DIAGNOSIS — Z98.890 OTHER SPECIFIED POSTPROCEDURAL STATES: Chronic | ICD-10-CM

## 2022-07-24 DIAGNOSIS — Z95.1 PRESENCE OF AORTOCORONARY BYPASS GRAFT: Chronic | ICD-10-CM

## 2022-07-24 LAB
ALBUMIN SERPL ELPH-MCNC: 4.4 G/DL — SIGNIFICANT CHANGE UP (ref 3.5–5.2)
ALP SERPL-CCNC: 76 U/L — SIGNIFICANT CHANGE UP (ref 30–115)
ALT FLD-CCNC: 55 U/L — HIGH (ref 0–41)
ANION GAP SERPL CALC-SCNC: 11 MMOL/L — SIGNIFICANT CHANGE UP (ref 7–14)
AST SERPL-CCNC: 26 U/L — SIGNIFICANT CHANGE UP (ref 0–41)
BASOPHILS # BLD AUTO: 0.04 K/UL — SIGNIFICANT CHANGE UP (ref 0–0.2)
BASOPHILS NFR BLD AUTO: 0.7 % — SIGNIFICANT CHANGE UP (ref 0–1)
BILIRUB SERPL-MCNC: 0.3 MG/DL — SIGNIFICANT CHANGE UP (ref 0.2–1.2)
BUN SERPL-MCNC: 16 MG/DL — SIGNIFICANT CHANGE UP (ref 10–20)
CALCIUM SERPL-MCNC: 9.4 MG/DL — SIGNIFICANT CHANGE UP (ref 8.5–10.1)
CHLORIDE SERPL-SCNC: 103 MMOL/L — SIGNIFICANT CHANGE UP (ref 98–110)
CO2 SERPL-SCNC: 28 MMOL/L — SIGNIFICANT CHANGE UP (ref 17–32)
CREAT SERPL-MCNC: 0.8 MG/DL — SIGNIFICANT CHANGE UP (ref 0.7–1.5)
EGFR: 106 ML/MIN/1.73M2 — SIGNIFICANT CHANGE UP
EOSINOPHIL # BLD AUTO: 0.14 K/UL — SIGNIFICANT CHANGE UP (ref 0–0.7)
EOSINOPHIL NFR BLD AUTO: 2.5 % — SIGNIFICANT CHANGE UP (ref 0–8)
GLUCOSE SERPL-MCNC: 98 MG/DL — SIGNIFICANT CHANGE UP (ref 70–99)
HCT VFR BLD CALC: 40.5 % — LOW (ref 42–52)
HGB BLD-MCNC: 14 G/DL — SIGNIFICANT CHANGE UP (ref 14–18)
IMM GRANULOCYTES NFR BLD AUTO: 1.6 % — HIGH (ref 0.1–0.3)
LYMPHOCYTES # BLD AUTO: 1.81 K/UL — SIGNIFICANT CHANGE UP (ref 1.2–3.4)
LYMPHOCYTES # BLD AUTO: 31.7 % — SIGNIFICANT CHANGE UP (ref 20.5–51.1)
MAGNESIUM SERPL-MCNC: 1.8 MG/DL — SIGNIFICANT CHANGE UP (ref 1.8–2.4)
MCHC RBC-ENTMCNC: 31.1 PG — HIGH (ref 27–31)
MCHC RBC-ENTMCNC: 34.6 G/DL — SIGNIFICANT CHANGE UP (ref 32–37)
MCV RBC AUTO: 90 FL — SIGNIFICANT CHANGE UP (ref 80–94)
MONOCYTES # BLD AUTO: 0.57 K/UL — SIGNIFICANT CHANGE UP (ref 0.1–0.6)
MONOCYTES NFR BLD AUTO: 10 % — HIGH (ref 1.7–9.3)
NEUTROPHILS # BLD AUTO: 3.06 K/UL — SIGNIFICANT CHANGE UP (ref 1.4–6.5)
NEUTROPHILS NFR BLD AUTO: 53.5 % — SIGNIFICANT CHANGE UP (ref 42.2–75.2)
NRBC # BLD: 0 /100 WBCS — SIGNIFICANT CHANGE UP (ref 0–0)
NT-PROBNP SERPL-SCNC: 51 PG/ML — SIGNIFICANT CHANGE UP (ref 0–300)
PLATELET # BLD AUTO: 233 K/UL — SIGNIFICANT CHANGE UP (ref 130–400)
POTASSIUM SERPL-MCNC: 4.1 MMOL/L — SIGNIFICANT CHANGE UP (ref 3.5–5)
POTASSIUM SERPL-SCNC: 4.1 MMOL/L — SIGNIFICANT CHANGE UP (ref 3.5–5)
PROT SERPL-MCNC: 6.6 G/DL — SIGNIFICANT CHANGE UP (ref 6–8)
RBC # BLD: 4.5 M/UL — LOW (ref 4.7–6.1)
RBC # FLD: 12.3 % — SIGNIFICANT CHANGE UP (ref 11.5–14.5)
SARS-COV-2 RNA SPEC QL NAA+PROBE: SIGNIFICANT CHANGE UP
SODIUM SERPL-SCNC: 142 MMOL/L — SIGNIFICANT CHANGE UP (ref 135–146)
TROPONIN T SERPL-MCNC: 0.14 NG/ML — CRITICAL HIGH
TROPONIN T SERPL-MCNC: <0.01 NG/ML — SIGNIFICANT CHANGE UP
WBC # BLD: 5.71 K/UL — SIGNIFICANT CHANGE UP (ref 4.8–10.8)
WBC # FLD AUTO: 5.71 K/UL — SIGNIFICANT CHANGE UP (ref 4.8–10.8)

## 2022-07-24 PROCEDURE — 74174 CTA ABD&PLVS W/CONTRAST: CPT | Mod: 26

## 2022-07-24 PROCEDURE — 93010 ELECTROCARDIOGRAM REPORT: CPT

## 2022-07-24 PROCEDURE — 71045 X-RAY EXAM CHEST 1 VIEW: CPT | Mod: 26

## 2022-07-24 PROCEDURE — 93010 ELECTROCARDIOGRAM REPORT: CPT | Mod: 76,77

## 2022-07-24 PROCEDURE — 99285 EMERGENCY DEPT VISIT HI MDM: CPT

## 2022-07-24 PROCEDURE — 71275 CT ANGIOGRAPHY CHEST: CPT | Mod: 26

## 2022-07-24 PROCEDURE — 99223 1ST HOSP IP/OBS HIGH 75: CPT

## 2022-07-24 RX ORDER — ESCITALOPRAM OXALATE 10 MG/1
10 TABLET, FILM COATED ORAL
Qty: 0 | Refills: 0 | DISCHARGE

## 2022-07-24 RX ORDER — FENOFIBRATE,MICRONIZED 130 MG
1 CAPSULE ORAL
Qty: 0 | Refills: 0 | DISCHARGE

## 2022-07-24 RX ORDER — MORPHINE SULFATE 50 MG/1
2 CAPSULE, EXTENDED RELEASE ORAL EVERY 6 HOURS
Refills: 0 | Status: DISCONTINUED | OUTPATIENT
Start: 2022-07-24 | End: 2022-07-26

## 2022-07-24 RX ORDER — FENOFIBRATE,MICRONIZED 130 MG
145 CAPSULE ORAL DAILY
Refills: 0 | Status: DISCONTINUED | OUTPATIENT
Start: 2022-07-24 | End: 2022-07-26

## 2022-07-24 RX ORDER — ASPIRIN/CALCIUM CARB/MAGNESIUM 324 MG
81 TABLET ORAL
Qty: 0 | Refills: 0 | DISCHARGE

## 2022-07-24 RX ORDER — PANTOPRAZOLE SODIUM 20 MG/1
40 TABLET, DELAYED RELEASE ORAL
Refills: 0 | Status: DISCONTINUED | OUTPATIENT
Start: 2022-07-24 | End: 2022-07-26

## 2022-07-24 RX ORDER — ROSUVASTATIN CALCIUM 5 MG/1
1 TABLET ORAL
Qty: 0 | Refills: 0 | DISCHARGE

## 2022-07-24 RX ORDER — LANOLIN ALCOHOL/MO/W.PET/CERES
3 CREAM (GRAM) TOPICAL AT BEDTIME
Refills: 0 | Status: DISCONTINUED | OUTPATIENT
Start: 2022-07-24 | End: 2022-07-26

## 2022-07-24 RX ORDER — METOPROLOL TARTRATE 50 MG
25 TABLET ORAL
Qty: 0 | Refills: 0 | DISCHARGE

## 2022-07-24 RX ORDER — LABETALOL HCL 100 MG
10 TABLET ORAL ONCE
Refills: 0 | Status: DISCONTINUED | OUTPATIENT
Start: 2022-07-24 | End: 2022-07-26

## 2022-07-24 RX ORDER — ASPIRIN/CALCIUM CARB/MAGNESIUM 324 MG
81 TABLET ORAL DAILY
Refills: 0 | Status: DISCONTINUED | OUTPATIENT
Start: 2022-07-24 | End: 2022-07-24

## 2022-07-24 RX ORDER — ESCITALOPRAM OXALATE 10 MG/1
20 TABLET, FILM COATED ORAL DAILY
Refills: 0 | Status: DISCONTINUED | OUTPATIENT
Start: 2022-07-24 | End: 2022-07-26

## 2022-07-24 RX ORDER — ASPIRIN/CALCIUM CARB/MAGNESIUM 324 MG
81 TABLET ORAL DAILY
Refills: 0 | Status: DISCONTINUED | OUTPATIENT
Start: 2022-07-24 | End: 2022-07-26

## 2022-07-24 RX ORDER — ESCITALOPRAM OXALATE 10 MG/1
20 TABLET, FILM COATED ORAL
Qty: 0 | Refills: 0 | DISCHARGE

## 2022-07-24 RX ORDER — ATORVASTATIN CALCIUM 80 MG/1
80 TABLET, FILM COATED ORAL AT BEDTIME
Refills: 0 | Status: DISCONTINUED | OUTPATIENT
Start: 2022-07-24 | End: 2022-07-26

## 2022-07-24 RX ORDER — ACETAMINOPHEN 500 MG
650 TABLET ORAL EVERY 6 HOURS
Refills: 0 | Status: DISCONTINUED | OUTPATIENT
Start: 2022-07-24 | End: 2022-07-26

## 2022-07-24 RX ORDER — PANTOPRAZOLE SODIUM 20 MG/1
1 TABLET, DELAYED RELEASE ORAL
Qty: 0 | Refills: 0 | DISCHARGE

## 2022-07-24 RX ORDER — LISINOPRIL 2.5 MG/1
5 TABLET ORAL DAILY
Refills: 0 | Status: DISCONTINUED | OUTPATIENT
Start: 2022-07-24 | End: 2022-07-26

## 2022-07-24 RX ORDER — METOPROLOL TARTRATE 50 MG
25 TABLET ORAL
Refills: 0 | Status: DISCONTINUED | OUTPATIENT
Start: 2022-07-24 | End: 2022-07-26

## 2022-07-24 RX ORDER — HEPARIN SODIUM 5000 [USP'U]/ML
INJECTION INTRAVENOUS; SUBCUTANEOUS
Qty: 25000 | Refills: 0 | Status: DISCONTINUED | OUTPATIENT
Start: 2022-07-24 | End: 2022-07-25

## 2022-07-24 RX ADMIN — ATORVASTATIN CALCIUM 80 MILLIGRAM(S): 80 TABLET, FILM COATED ORAL at 22:14

## 2022-07-24 NOTE — ED ADULT NURSE NOTE - NSIMPLEMENTINTERV_GEN_ALL_ED
Implemented All Fall with Harm Risk Interventions:  [place] to call system. Call bell, personal items and telephone within reach. Instruct patient to call for assistance. Room bathroom lighting operational. Non-slip footwear when patient is off stretcher. Physically safe environment: no spills, clutter or unnecessary equipment. Stretcher in lowest position, wheels locked, appropriate side rails in place. Provide visual cue, wrist band, yellow gown, etc. Monitor gait and stability. Monitor for mental status changes and reorient to person, place, and time. Review medications for side effects contributing to fall risk. Reinforce activity limits and safety measures with patient and family. Provide visual clues: red socks.

## 2022-07-24 NOTE — ED PROVIDER NOTE - CLINICAL SUMMARY MEDICAL DECISION MAKING FREE TEXT BOX
Patient presented with chest pain x 4 hours Otherwise afebrile, Hd stable, well appearing. EKG obtained which showed TWI as documented but otherwise without evidence of STEMI. Obtained labs which were grossly unremarkable including no significant leukocytosis, anemia, signs of dehydration/HANH, transaminitis or significant electrolyte abnormalities. Trop negative. Chest xray negative for pneumothorax, pneumonia, widened mediastinum, evidence of rib fractures, enlarged cardiac silhouette or any other emergent pathologies. Patient remained without recurrence of chest pain or abnormalities during monitoring in ED. HEART Score 5 however and story concerning for possible ACS - will admit for further rule out. Patient agreeable with plan. HD stable at time of admission.

## 2022-07-24 NOTE — H&P ADULT - NSHPLABSRESULTS_GEN_ALL_CORE
.  LABS:                         14.0   5.71  )-----------( 233      ( 24 Jul 2022 15:36 )             40.5     07-24    142  |  103  |  16  ----------------------------<  98  4.1   |  28  |  0.8    Ca    9.4      24 Jul 2022 15:36  Mg     1.8     07-24    TPro  6.6  /  Alb  4.4  /  TBili  0.3  /  DBili  x   /  AST  26  /  ALT  55<H>  /  AlkPhos  76  07-24        Serum Pro-Brain Natriuretic Peptide: 51 pg/mL (07-24 @ 15:36)        RADIOLOGY, EKG & ADDITIONAL TESTS: Reviewed.

## 2022-07-24 NOTE — ED PROVIDER NOTE - PHYSICAL EXAMINATION
CONST: well appearing for age  HEAD:  normocephalic, atraumatic  EYES: PERRLA, conjunctivae without injection, drainage or discharge  ENMT: nasal mucosa moist; mouth moist without ulcerations or lesions; throat moist without erythema, exudate, ulcerations or lesions  NECK:  supple  CARDIAC:  regular rate and rhythm, normal S1 and S2, no murmurs, rubs or gallops  RESP:  respiratory rate and effort appear normal for age; lungs are clear to auscultation bilaterally; no rales or wheezes  ABDOMEN:  soft, nontender, nondistended  MUSCULOSKELETAL/NEURO: AAOx3, CN II-XII grossly intact, sensation intact throughout, normal movement, normal tone  SKIN:  normal skin color for age and race, well-perfused; warm and dry

## 2022-07-24 NOTE — H&P ADULT - ATTENDING COMMENTS
52 YO M with a PMH of CAD s/p PCI/CABG, HTN, HLD, and depression who presents to the hospital with a c/o CP that started this AM. Described as soreness, central, radiating to B/L shoulders, and random intermittent episodes lasting ~ 1 hour. - worse with exertion. Associated with - SOB, - nausea, - palpitations, and - diaphoresis. Did not take SLN or ASA prior to arrival. Denies any fevers/chills, cough, ABD pain, LE swelling, dysuria, headaches, or rashes.     In the ED, cardiac enzymes were negative and an EKG showed no ischemic changes.     Family hx of early heart disease (-)     Physical exam shows pt in NAD, resting comfortably. VSS, afebrile, not hypoxic on RA. A&Ox3. Neuro exam intact. CTA B/L with no W/C/R. RRR, no M/G/R. ABD is soft and non-tender to palpation, normoactive BSs. LEs without swelling, pulses palpated bilaterally. No rashes. Labs and imaging as above resident note.     Chest pain, typical, rule out ACS. Admit to tele. Cardio consult. Serial cardiac enzymes and EKGs. A1c, Lipids, and TSH. Echo. Stress test out-pt. PRN pain meds. Restart home meds.   UPDATE at 2300: Pt's repeat troponin is 0.14, pt currently complains of active CP. Will start on ACS protocol. Heparin drip, serial coags. ASA. Cardio fellow aware.     Hypertensive urgency from medication non-compliance. Restart home meds. Monitor VSs.     HX of CAD s/p PCI/CABG, HLD, and depression. Restart home meds, except as stated above. DVT PPX. Inform PCP of pt's admission to hospital. My note supersedes the residents note.     Date seen by [email or web address]

## 2022-07-24 NOTE — ED ADULT NURSE REASSESSMENT NOTE - NS ED NURSE REASSESS COMMENT FT1
Patient received in room at start of shift. Patient presents in calm and pleasant manner. Alert and oriented x4, fully ambulatory. Patient reports all pain has subsided and he wishes to go home. Patient states he will wait for testing but wishes to follow up out-patient if possible.

## 2022-07-24 NOTE — ED PROVIDER NOTE - ATTENDING CONTRIBUTION TO CARE
51 year old male, pmhx as documented presenting with chest pain x 4 hours described as tight, substernal, radiating to his back, no palliative or provocative factors, moderate severity that began while setting up an electric bike. Also has (+) SOB. Otherwise denies fevers, palpitations, N/V/D, blood in stool, urinary symptoms or leg swelling.    Vital Signs: I have reviewed the initial vital signs.  Constitutional: NAD, well-nourished, appears stated age, no acute distress.  HEENT: Airway patent, moist MM, no erythema/swelling/deformity of oral structures. EOMI, PERRLA.  CV: regular rate, regular rhythm, well-perfused extremities, 2+ b/l DP and radial pulses equal.  Lungs: BCTA, no increased WOB.  ABD: NTND, no guarding or rebound, no pulsatile mass, no hernias.   MSK: Neck supple, nontender, nl ROM, no stepoff. Chest nontender. Back nontender in TLS spine or to b/l bony structures or flanks. Ext nontender, nl rom, no deformity.   INTEG: Skin warm, dry, no rash.  NEURO: A&Ox3, normal strength, nl sensation throughout, normal speech.   PSYCH: Calm, cooperative, normal affect and interaction.    Will obtain EKG, CXR, labs, consider CTA to r/o dissection given radiation to back, re-eval.

## 2022-07-24 NOTE — ED PROVIDER NOTE - ADMIT DISPOSITION PRESENT ON ADMISSION SEPSIS
"[facility] Medicine  Progress Note    Patient Name: Joanne Peña  MRN: [id number]  Patient Class: IP- Inpatient   Admission Date: 2/6/2022  Length of Stay: 2 days  Attending Physician: Madiha Kowalski DO  Primary Care Provider: Marielle Gibson MD        Subjective:     Principal Problem:Cerebral vascular accident        HPI:  Ms. Peña is a 66yo lady with a pas medical history of HTN, DM2 and tobacco abuse (smokes 1/2 PPD).  Her COVID vaccination status is unknown, though she was positive on 8/24/21.     She now comes to the ED with vertigo when she woke up this morning.  She states things, "were just off when looking at them and kind of spinning."  She had her brother drive her to work (at the [employer]) and had an episode of N/V en route.  Shortly after arriving at the [employer], around 2:15pm, she developed a weird out of body sensation and vague confusion, like she could not recognize her inmates she's known forever.  Shortly after this she had abrupt onet of slurred speech and right sided weakness.  She had to go sit down and call the [employer] nurse to evaluate her.  She denied double vision with the vertigo and denied any numbness or tingling (other than an odd burning in her nose before the symptoms).    When the nurse arrived she had dysarthria and a measured BP of 215/115, so she took a clonidine 0.1mg x 1 and ASA 325mg.  She called her daughter to come pick her up, who then drove her to the ED.  She states that her symptoms all resolved shortly after taking the ASA (<1 hour).  She never had any neck pain or headache.  She feels all of her symptoms have now completely resolved, including the vertigo.  On arrival to the ED, she did have a brief recurrence of the above symptoms, but it resolved of its own accord after a few minutes.     In the ED she was found to be hypertensive with a CTA head that showed No CT evidence of large territorial infarction and absence of flow within the V3 segment " right vertebral artery, suggestive of occlusion.  [place] tele stroke consult was obtained and recommended Plavix 600mg and full dose ASA (already had PTA), and MRI brain stat.         Overview/Hospital Course:  Ms. Garza is a 66yo lady with a pas medical history of HTN, DM2 and tobacco abuse (smokes 1/2 PPD) admitted to ICU on 02/06/2022 with dizziness and found to have left santa genny and left cerebellum infarctions.     MRI brain with Small acute infarction in the left santa genny and small subacute infarction in the left inferior cerebellum.  No evidence of intracranial hemorrhage. MRA brain with significant diminished flow in the posterior circulation with nonvisualization of flow within the right vertebral artery and intermittent flow in the left vertebral artery.  Multiple luminal narrowing involving the basilar with suspected multiple high-grade stenosis versus occlusions. Patient loaded with aspirin and plavix. 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 She was not a candidate for tpa. Neurology consulted. Patient transfer to the floor on 02/07/2022. Echo with EF of 65% and no evidence of intracardiac shunting. G2DD.  Will restart home BP meds, and monitor blood pressure. Titrate as needed. PT/OT recommended outpatient therapy on discharge.       Interval History: No acute overnight events. she is currently not dizzy or having any abnormal symptoms this morning. States she feeling better overall. Still feel weak on her left side.     Review of Systems   Constitutional: Negative for chills, fatigue and fever.   HENT: Negative for congestion.    Respiratory: Negative for cough and shortness of breath.    Cardiovascular: Negative for chest pain.   Gastrointestinal: Negative for abdominal pain, constipation, diarrhea, nausea and vomiting.   Endocrine: Negative for heat intolerance.   Genitourinary: Negative for dysuria.   Musculoskeletal: Positive for gait  problem.   Skin: Negative for rash.   Neurological: Positive for weakness. Negative for dizziness and speech difficulty.   Hematological: Does not bruise/bleed easily.   Psychiatric/Behavioral: Negative for confusion. The patient is not nervous/anxious.      Objective:     Vital Signs (Most Recent):  Temp: 98.2 °F (36.8 °C) (02/08/22 1300)  Pulse: 97 (02/08/22 1300)  Resp: 20 (02/08/22 1048)  BP: (!) 160/79 (02/08/22 1300)  SpO2: 98 % (02/08/22 1300) Vital Signs (24h Range):  Temp:  [98.2 °F (36.8 °C)-99 °F (37.2 °C)] 98.2 °F (36.8 °C)  Pulse:  [68-97] 97  Resp:  [18-33] 20  SpO2:  [93 %-98 %] 98 %  BP: (131-197)/(71-90) 160/79     Weight: 79.4 kg (175 lb)  Body mass index is 27.41 kg/m².    Intake/Output Summary (Last 24 hours) at 2/8/2022 1523  Last data filed at 2/8/2022 1300  Gross per 24 hour   Intake 480 ml   Output --   Net 480 ml      Physical Exam  Vitals and nursing note reviewed.   Constitutional:       General: She is awake. She is not in acute distress.     Appearance: She is well-developed. She is not diaphoretic.   HENT:      Head: Normocephalic and atraumatic.      Mouth/Throat:      Pharynx: No oropharyngeal exudate.   Eyes:      General: No scleral icterus.        Right eye: No discharge.         Left eye: No discharge.      Conjunctiva/sclera: Conjunctivae normal.      Pupils: Pupils are equal, round, and reactive to light.      Comments: PERRL, no ptosis   Neck:      Thyroid: No thyromegaly.      Vascular: No JVD.      Trachea: No tracheal deviation.   Cardiovascular:      Rate and Rhythm: Normal rate and regular rhythm.      Heart sounds: Normal heart sounds. No murmur heard.  No friction rub. No gallop.    Pulmonary:      Effort: Pulmonary effort is normal. No respiratory distress.      Breath sounds: Normal breath sounds. No stridor. No decreased breath sounds, wheezing, rhonchi or rales.   Chest:      Chest wall: No tenderness.   Abdominal:      General: Bowel sounds are normal. There is no  distension.      Palpations: Abdomen is soft. There is no mass.      Tenderness: There is no abdominal tenderness. There is no guarding or rebound.   Genitourinary:     Comments: No agustin in place  Musculoskeletal:         General: No tenderness. Normal range of motion.      Cervical back: Normal range of motion and neck supple.      Right lower leg: No edema.      Left lower leg: No edema.   Lymphadenopathy:      Comments: No peripheral edema   Skin:     General: Skin is warm and dry.      Coloration: Skin is not pale.      Findings: No erythema or rash.   Neurological:      Mental Status: She is alert and easily aroused.      GCS: GCS eye subscore is 4. GCS verbal subscore is 5. GCS motor subscore is 6.      Cranial Nerves: No cranial nerve deficit.      Motor: No tremor, abnormal muscle tone or pronator drift.      Coordination: Coordination normal.      Comments: Symmetrical strength bilaterally this morning, no focal deficits. No dizziness/vertigo symptoms   Psychiatric:         Attention and Perception: Attention and perception normal.         Behavior: Behavior normal.         Thought Content: Thought content normal.         Cognition and Memory: Cognition and memory normal.         Judgment: Judgment normal.         Significant Labs: All pertinent labs within the past 24 hours have been reviewed.    Significant Imaging: I have reviewed all pertinent imaging results/findings within the past 24 hours.      Assessment/Plan:      * Cerebral vascular accident  MRI/MRA - it does not appear that the right vertebral artery is acute as she looks like she has robust blood flow in other areas suggesting more chronic in nature  Appreciated Neurovascular stroke assessment and recommendations   Patient took ASA 325mg PTA and now on 81 mg daily  She was loaded with Plavix 600mg po x 1 and now on 75 mg daily for 21 days    Admitted to ICU due to stuttering symptoms and malignant HTN (although likely reactive)  Allow for  permissive HTN per stroke protocol interventions  CVA best practice pathway order sets have been initiated  TTE with bubble study w/ no evidence of intracardiac shunting  Neurology consulted - appreciate recommendations  DVT prophylaxis with Lovenox 40mg daily  PT/OT/SLP consulted. Recommends outpatient PT/OT      Malignant essential hypertension  - Allowed for permissive hypertension for 24hrs  -Will resume home Norvasc 10mg. She was not on any other HTN meds per patient  -BP still elevated, will start Losartan 50mg with hold parameters  -Of note, patient stated she did not tolerate lisinopril in the past because it caused weakness  -Monitor BP          Hypokalemia  Replace as needed  Resolved      Type 2 diabetes mellitus with hyperglycemia, without long-term current use of insulin  A1c:   Lab Results   Component Value Date    HGBA1C 7.6 (H) 02/06/2022     Meds: basal bolus insulin + SSI PRN to maintain goal 140-180  ADA diet, accuchecks ACHS, hypoglycemic protocol  - adjust as needed    Normocytic anemia  Stable  No signs of bleeding    Mixed hyperlipidemia  Continue home Lipitor 80mg po qday    Moderate cigarette smoker (10-19 per day)  Patient was counseled regarding smoking for 3-10 minutes.          Overweight with body mass index (BMI) of 25 to 25.9 in adult  Encourage exercise and healthy diet  Body mass index is 27.41 kg/m².  Weight loss as outpatient          VTE Risk Mitigation (From admission, onward)         Ordered     enoxaparin injection 40 mg  Daily         02/06/22 1953     Place MIRIAN hose  Until discontinued         02/06/22 1953     IP VTE LOW RISK PATIENT  Once         02/06/22 1953     Place sequential compression device  Until discontinued         02/06/22 1953                Discharge Planning   ARIANA: 2/8/2022     Code Status: Full Code   Is the patient medically ready for discharge?:     Reason for patient still in hospital (select all that apply): Patient trending condition and Treatment                      Madiha Kowalski DO  Department of Hospital Medicine   [facility] - Med Surg     No

## 2022-07-24 NOTE — H&P ADULT - HISTORY OF PRESENT ILLNESS
Pt is a 52 y/o male with PMH of CAD s/p PCI and CABG (follows with Dr. Ruiz), HTN, HLD and depression presenting for chest pain x 4 hrs. Pt says he was at home setting up an electric bike when he felt midsternal chest pain that radiates to bilateral scapula. Worsens with exertion. Associated with mild SOB. Took 3 baby ASAs this morning. No fever, cough, nausea/vomiting or lightheadedness. Pt is a 50 y/o male with PMH of CAD s/p PCI and CABG (follows with Dr. Ruiz), HTN, HLD and depression presenting for chest pain since the AM. Pt says he was at home setting up an electric bike when he felt midsternal chest pain that radiates to bilateral scapula. Worsens with exertion. Associated with mild SOB. It felt like his previous heart attack and not like acid reflux which he suffers one. Took 3 baby ASAs this morning with no relief. With minimal improvement in symptoms he presented to the ED. Pt reports No fever, cough, nausea/vomiting or lightheadedness.     In the ED: SBP > 180, pain radiating to between the scapula, pt SOB. trops were (-) x1 and ekg was nsr. When seen by medical team, SBP between both arms were 20mmhg on multiple repeats (160/90 on LUE, 180/100 on RUE). CT chest w/ con dissection protocol was ordered and is pending. Pt admitted with placement based on ct results.

## 2022-07-24 NOTE — ED PROVIDER NOTE - ST/T WAVE
No ST elevation/depression, TWI in III Burow's Advancement Flap Text: The defect edges were debeveled with a #15 scalpel blade.  Given the location of the defect and the proximity to free margins a Burow's advancement flap was deemed most appropriate.  Using a sterile surgical marker, the appropriate advancement flap was drawn incorporating the defect and placing the expected incisions within the relaxed skin tension lines where possible.    The area thus outlined was incised deep to adipose tissue with a #15 scalpel blade.  The skin margins were undermined to an appropriate distance in all directions utilizing iris scissors.

## 2022-07-24 NOTE — CONSULT NOTE ADULT - SUBJECTIVE AND OBJECTIVE BOX
Cardiologist: Sara    HPI:  Pt is a 50 y/o male with PMH of CAD s/p PCI and CABG (follows with Dr. Ruiz), HTN, HLD and depression presenting for chest pain since the AM. Pt says he was at home setting up an electric bike when he felt midsternal chest pain that radiates to bilateral scapula. Worsens with exertion. Associated with mild SOB. It felt like his previous heart attack and not like acid reflux which he suffers one. Took 3 baby ASAs this morning with no relief. With minimal improvement in symptoms he presented to the ED. Pt reports No fever, cough, nausea/vomiting or lightheadedness.     In the ED: SBP > 180, pain radiating to between the scapula, pt SOB. trops were (-) x1 and ekg was nsr. When seen by medical team, SBP between both arms were 20mmhg on multiple repeats (160/90 on LUE, 180/100 on RUE). CT chest w/ con dissection protocol was ordered and is pending. Pt admitted with placement based on ct results.  (24 Jul 2022 17:55)      PAST MEDICAL & SURGICAL HISTORY  Hypertension, unspecified type    High cholesterol    Coronary artery disease    History of appendectomy    History of quadruple bypass        FAMILY HISTORY:  FAMILY HISTORY:  Family history of coronary artery disease in brother (Father, Sibling)    [ ] no pertinent family history of premature cardiovascular disease in first degree relatives.  Mother:   Father:   Siblings:     SOCIAL HISTORY:  [x]smoker  []Alcohol  []Drug    ALLERGIES:  No Known Allergies    MEDICATIONS:  MEDICATIONS  (STANDING):  aspirin  chewable 81 milliGRAM(s) Oral daily  atorvastatin 80 milliGRAM(s) Oral at bedtime  escitalopram 20 milliGRAM(s) Oral daily  fenofibrate Tablet 145 milliGRAM(s) Oral daily  heparin  Infusion.  Unit(s)/Hr (10 mL/Hr) IV Continuous <Continuous>  lisinopril 5 milliGRAM(s) Oral daily  metoprolol tartrate 25 milliGRAM(s) Oral two times a day  pantoprazole    Tablet 40 milliGRAM(s) Oral before breakfast    MEDICATIONS  (PRN):  acetaminophen     Tablet .. 650 milliGRAM(s) Oral every 6 hours PRN Temp greater or equal to 38C (100.4F), Mild Pain (1 - 3)  labetalol Injectable 10 milliGRAM(s) IV Push once PRN SBP > 160  melatonin 3 milliGRAM(s) Oral at bedtime PRN Insomnia  morphine  - Injectable 2 milliGRAM(s) IV Push every 6 hours PRN Moderate Pain (4 - 6)    HOME MEDICATIONS:  Home Medications:  aspirin: 81 milligram(s) orally once a day (24 Jul 2022 19:04)  fenofibrate 145 mg oral tablet: 1 tab(s) orally once a day (24 Jul 2022 19:04)  Lexapro: 20 milligram(s) orally once a day (24 Jul 2022 19:04)  metoprolol: 25 milligram(s) orally 2 times a day (24 Jul 2022 19:04)  pantoprazole 40 mg oral delayed release tablet: 1 tab(s) orally once a day (24 Jul 2022 19:04)  rosuvastatin 40 mg oral tablet: 1 tab(s) orally once a day (24 Jul 2022 19:04)    VITALS:   T(F): 97.7 (07-24 @ 14:34), Max: 97.7 (07-24 @ 14:34)  HR: 62 (07-24 @ 19:36) (62 - 62)  BP: 168/101 (07-24 @ 19:36) (168/101 - 186/93)  BP(mean): --  RR: 18 (07-24 @ 19:36) (18 - 82)  SpO2: 100% (07-24 @ 19:36) (100% - 100%)    REVIEW OF SYSTEMS:    Negative except as mentioned in HPI    PHYSICAL EXAM:  NEURO: Awake , alert and oriented  GEN: Not in acute distress  NECK: No JVD  LUNGS: Clear to auscultation bilaterally   CARDIOVASCULAR: S1/S2 present, RRR , no murmurs or rubs, no carotid bruits,  + PP bilaterally  ABD: Soft, non-tender, non-distended, +BS  EXT: No LUDY  SKIN: Intact    LABS:                        14.0   5.71  )-----------( 233      ( 24 Jul 2022 15:36 )             40.5     07-24    142  |  103  |  16  ----------------------------<  98  4.1   |  28  |  0.8    Ca    9.4      24 Jul 2022 15:36  Mg     1.8     07-24    TPro  6.6  /  Alb  4.4  /  TBili  0.3  /  DBili  x   /  AST  26  /  ALT  55<H>  /  AlkPhos  76  07-24    Troponin T, Serum: 0.14 ng/mL *HH* (07-24-22 @ 21:48)  Troponin T, Serum: <0.01 ng/mL (07-24-22 @ 15:36)    CARDIAC MARKERS ( 24 Jul 2022 21:48 )  x     / 0.14 ng/mL / x     / x     / x      CARDIAC MARKERS ( 24 Jul 2022 15:36 )  x     / <0.01 ng/mL / x     / x     / x        Serum Pro-Brain Natriuretic Peptide: 51 pg/mL (07-24-22 @ 15:36)    RADIOLOGY:  -CXR:  < from: CT Angio Chest Aorta w/wo IV Cont (07.24.22 @ 20:41) >  IMPRESSION:    No CTA evidence of aortic dissection or intramural hematoma.    < end of copied text >    -TTE:  < from: Transthoracic Echocardiogram (02.10.19 @ 17:28) >  Summary:   1. LV Ejection Fraction by Coon's Method with a biplane EF of 63 %.   2. Spectral Doppler shows impaired relaxation pattern of left   ventricular myocardial filling (Grade I diastolic dysfunction).   3. Mild tricuspid regurgitation.    < end of copied text >    -CCTA:    -STRESS TEST:  < from: NM Nuclear Stress Multiple (05.12.19 @ 11:48) >  Impression:   1. Small-size reversible defect in the inferior wall of the left   ventricle consistent with ischemia.    2. Normal left ventricular wall motion and wall thickening.    3. Left ventricular ejection fraction calculated is 60% which is within   the range of normal    < end of copied text >    -CATHETERIZATION:  < from: Cardiac Cath Lab - Adult (02.12.19 @ 12:28) >  CORONARY CIRCULATION: There was 3-vessel coronary artery disease ( 100 %    LAD, 100 % RCA, and 70 % circumflex). Bypass grafts were patent. 1st    obtuse marginal: There was a tubular 80 % stenosis at a site with no prior    intervention, distal to the graft anastomosis. There was SUMAN grade 3 flow    through the vessel (brisk flow). This lesion is a likely culprit for the    patient's recent myocardial infarction. An intervention was performed.    < end of copied text >    ECG:  < from: 12 Lead ECG (07.24.22 @ 14:30) >  Ventricular Rate 82 BPM    Atrial Rate 82 BPM    P-R Interval 154 ms    QRS Duration 90 ms    Q-T Interval 358 ms    QTC Calculation(Bazett) 418 ms    P Axis 43 degrees    R Axis 46 degrees    T Axis 10 degrees    Diagnosis Line Normal sinus rhythm  Normal ECG    < end of copied text >      TELEMETRY EVENTS: sinus rhythm   Cardiologist: Sara    HPI:  Pt is a 52 y/o male with PMH of CAD s/p PCI and CABG (follows with Dr. Ruiz), HTN, HLD and depression presenting for chest pain since the AM. Pt says he was at home setting up an electric bike when he felt midsternal chest pain that radiates to bilateral scapula. Worsens with exertion. Associated with mild SOB. It felt like his previous heart attack and not like acid reflux which he suffers one. Took 3 baby ASAs this morning with no relief. With minimal improvement in symptoms he presented to the ED. Pt reports No fever, cough, nausea/vomiting or lightheadedness.     In the ED: SBP > 180, pain radiating to between the scapula, pt SOB. trops were (-) x1 and ekg was nsr. When seen by medical team, SBP between both arms were 20mmhg on multiple repeats (160/90 on LUE, 180/100 on RUE). CT chest w/ con dissection protocol was ordered and is pending. Pt admitted with placement based on ct results.  (24 Jul 2022 17:55)      PAST MEDICAL & SURGICAL HISTORY  Hypertension, unspecified type    High cholesterol    Coronary artery disease    History of appendectomy    History of quadruple bypass        FAMILY HISTORY:  FAMILY HISTORY:  Family history of coronary artery disease in brother (Father, Sibling)    [ ] no pertinent family history of premature cardiovascular disease in first degree relatives.  Mother:   Father:   Siblings:     SOCIAL HISTORY:  [x]smoker  []Alcohol  []Drug    ALLERGIES:  No Known Allergies    MEDICATIONS:  MEDICATIONS  (STANDING):  aspirin  chewable 81 milliGRAM(s) Oral daily  atorvastatin 80 milliGRAM(s) Oral at bedtime  escitalopram 20 milliGRAM(s) Oral daily  fenofibrate Tablet 145 milliGRAM(s) Oral daily  heparin  Infusion.  Unit(s)/Hr (10 mL/Hr) IV Continuous <Continuous>  lisinopril 5 milliGRAM(s) Oral daily  metoprolol tartrate 25 milliGRAM(s) Oral two times a day  pantoprazole    Tablet 40 milliGRAM(s) Oral before breakfast    MEDICATIONS  (PRN):  acetaminophen     Tablet .. 650 milliGRAM(s) Oral every 6 hours PRN Temp greater or equal to 38C (100.4F), Mild Pain (1 - 3)  labetalol Injectable 10 milliGRAM(s) IV Push once PRN SBP > 160  melatonin 3 milliGRAM(s) Oral at bedtime PRN Insomnia  morphine  - Injectable 2 milliGRAM(s) IV Push every 6 hours PRN Moderate Pain (4 - 6)    HOME MEDICATIONS:  Home Medications:  aspirin: 81 milligram(s) orally once a day (24 Jul 2022 19:04)  fenofibrate 145 mg oral tablet: 1 tab(s) orally once a day (24 Jul 2022 19:04)  Lexapro: 20 milligram(s) orally once a day (24 Jul 2022 19:04)  metoprolol: 25 milligram(s) orally 2 times a day (24 Jul 2022 19:04)  pantoprazole 40 mg oral delayed release tablet: 1 tab(s) orally once a day (24 Jul 2022 19:04)  rosuvastatin 40 mg oral tablet: 1 tab(s) orally once a day (24 Jul 2022 19:04)    VITALS:   T(F): 97.7 (07-24 @ 14:34), Max: 97.7 (07-24 @ 14:34)  HR: 62 (07-24 @ 19:36) (62 - 62)  BP: 168/101 (07-24 @ 19:36) (168/101 - 186/93)  BP(mean): --  RR: 18 (07-24 @ 19:36) (18 - 82)  SpO2: 100% (07-24 @ 19:36) (100% - 100%)    REVIEW OF SYSTEMS:    Negative except as mentioned in HPI    PHYSICAL EXAM:  NEURO: Awake , alert and oriented  GEN: Not in acute distress  NECK: No JVD  LUNGS: Clear to auscultation bilaterally   CARDIOVASCULAR: S1/S2 present, RRR , no murmurs or rubs, no carotid bruits,  + PP bilaterally  ABD: Soft, non-tender, non-distended, +BS  EXT: No LUDY  SKIN: Intact    LABS:                        14.0   5.71  )-----------( 233      ( 24 Jul 2022 15:36 )             40.5     07-24    142  |  103  |  16  ----------------------------<  98  4.1   |  28  |  0.8    Ca    9.4      24 Jul 2022 15:36  Mg     1.8     07-24    TPro  6.6  /  Alb  4.4  /  TBili  0.3  /  DBili  x   /  AST  26  /  ALT  55<H>  /  AlkPhos  76  07-24    Troponin T, Serum: 0.14 ng/mL *HH* (07-24-22 @ 21:48)  Troponin T, Serum: <0.01 ng/mL (07-24-22 @ 15:36)    CARDIAC MARKERS ( 24 Jul 2022 21:48 )  x     / 0.14 ng/mL / x     / x     / x      CARDIAC MARKERS ( 24 Jul 2022 15:36 )  x     / <0.01 ng/mL / x     / x     / x        Serum Pro-Brain Natriuretic Peptide: 51 pg/mL (07-24-22 @ 15:36)    RADIOLOGY:  -CXR:  < from: CT Angio Chest Aorta w/wo IV Cont (07.24.22 @ 20:41) >  IMPRESSION:    No CTA evidence of aortic dissection or intramural hematoma.    < end of copied text >    -TTE:  < from: Transthoracic Echocardiogram (02.10.19 @ 17:28) >  Summary:   1. LV Ejection Fraction by Coon's Method with a biplane EF of 63 %.   2. Spectral Doppler shows impaired relaxation pattern of left   ventricular myocardial filling (Grade I diastolic dysfunction).   3. Mild tricuspid regurgitation.    < end of copied text >    -CCTA:    -STRESS TEST:  < from: NM Nuclear Stress Multiple (05.12.19 @ 11:48) >  Impression:   1. Small-size reversible defect in the inferior wall of the left   ventricle consistent with ischemia.    2. Normal left ventricular wall motion and wall thickening.    3. Left ventricular ejection fraction calculated is 60% which is within   the range of normal    < end of copied text >    -CATHETERIZATION:  < from: Cardiac Cath Lab - Adult (02.12.19 @ 12:28) >  CORONARY CIRCULATION: There was 3-vessel coronary artery disease ( 100 %    LAD, 100 % RCA, and 70 % circumflex). Bypass grafts were patent. 1st    obtuse marginal: There was a tubular 80 % stenosis at a site with no prior    intervention, distal to the graft anastomosis. There was SUMAN grade 3 flow    through the vessel (brisk flow). This lesion is a likely culprit for the    patient's recent myocardial infarction. An intervention was performed.    < end of copied text >    CABG 2015: free EDUIN to OM, SVG to PDA, PLV reverse sequential, LIMA to LAD    ECG:  < from: 12 Lead ECG (07.24.22 @ 14:30) >  Ventricular Rate 82 BPM    Atrial Rate 82 BPM    P-R Interval 154 ms    QRS Duration 90 ms    Q-T Interval 358 ms    QTC Calculation(Bazett) 418 ms    P Axis 43 degrees    R Axis 46 degrees    T Axis 10 degrees    Diagnosis Line Normal sinus rhythm  Normal ECG    < end of copied text >      TELEMETRY EVENTS: sinus rhythm

## 2022-07-24 NOTE — H&P ADULT - ASSESSMENT
Pt is a 50 y/o male with PMH of CAD s/p PCI and CABG (follows with Dr. Ruiz), HTN, HLD and depression presenting for chest pain x 4 hrs. Pt says he was at home setting up an electric bike when he felt midsternal chest pain that radiates to bilateral scapula. Worsens with exertion. Associated with mild SOB. Took 3 baby ASAs this morning. No fever, cough, nausea/vomiting or lightheadedness    # Chest pain, work up for acs   # H/O CAD s/p CABG   - 2d echo  - trops (-)x1, f/u repeat, ekg normal sinus rhythm   - admit to tele   - lipitor   - if patient develops worsening pain,   - follows with dr. ruiz, consult pending     # HTN, HLD, Depression  - c/w home meds    # DVT ppx-  # GI ppx-  # Activity-  # Diet-  # Code status  # Dispo-      Pt is a 50 y/o male with PMH of CAD s/p PCI and CABG (follows with Dr. Ruiz), HTN, HLD and depression presenting for chest pain x 4 hrs. Pt says he was at home setting up an electric bike when he felt midsternal chest pain that radiates to bilateral scapula. Worsens with exertion. Associated with mild SOB. Took 3 baby ASAs this morning. No fever, cough, nausea/vomiting or lightheadedness    # Chest pain, work up for acs   # Aortic dissection r/o   # H/O CAD s/p PCI and CABG  - pt has interscapular pain similair to his previous MI, different bp in BL UE (/90, /100 on multiple repeats)   - trops (-) x1, f/u repeat, ekg nsr on admission    - f/u cta of chest and abdomen  - 2d echo  - urgent CT sx consult if dissection (+)   - c/w statin, metoprolol, lisinopril   - give 1x dose labetalol 10mg IV  - follows with dr. ruiz, consult pending   - hold asa for now until CT scan     # HTN, HLD, Depression, GERD  - c/w home meds    # DVT ppx- on hold for now   # GI ppx- on PPI for GERD  # Activity- AAT  # Diet- NPO for now   # Code status - full  # Dispo- from home, wants to leave AMA, convinced to stay

## 2022-07-24 NOTE — H&P ADULT - NSHPPHYSICALEXAM_GEN_ALL_CORE
VITALS:   Vital Signs Last 24 Hrs  T(C): 36.5 (24 Jul 2022 14:34), Max: 36.5 (24 Jul 2022 14:34)  T(F): 97.7 (24 Jul 2022 14:34), Max: 97.7 (24 Jul 2022 14:34)  HR: --  BP: 186/93 (24 Jul 2022 14:34) (186/93 - 186/93)  BP(mean): --  RR: 82 (24 Jul 2022 14:34) (82 - 82)  SpO2: 100% (24 Jul 2022 14:34) (100% - 100%)    Parameters below as of 24 Jul 2022 14:34  Patient On (Oxygen Delivery Method): room air      I&O's Summary    CAPILLARY BLOOD GLUCOSE          PHYSICAL EXAM:  General: WN/WD NAD  HEENT: PERRLA, EOMI, moist mucous membranes  Neurology: A&Ox3, nonfocal, BISHOP x 4  Respiratory: CTA B/L, normal respiratory effort, no wheezes, crackles, rales  CV: RRR, S1S2, no murmurs, rubs or gallops  Abdominal: Soft, NT, ND +BS, Last BM  Extremities: No edema, + peripheral pulses  Incisions:   Tubes: VITALS:   Vital Signs Last 24 Hrs  T(C): 36.5 (24 Jul 2022 14:34), Max: 36.5 (24 Jul 2022 14:34)  T(F): 97.7 (24 Jul 2022 14:34), Max: 97.7 (24 Jul 2022 14:34)  HR: --  BP: 186/93 (24 Jul 2022 14:34) (186/93 - 186/93)  BP(mean): --  RR: 82 (24 Jul 2022 14:34) (82 - 82)  SpO2: 100% (24 Jul 2022 14:34) (100% - 100%)    Parameters below as of 24 Jul 2022 14:34  Patient On (Oxygen Delivery Method): room air      I&O's Summary    CAPILLARY BLOOD GLUCOSE          PHYSICAL EXAM:  General: WN/WD NAD  HEENT: PERRLA, EOMI, moist mucous membranes  Neurology: A&Ox3, nonfocal, BISHOP x 4  Respiratory: CTA B/L, normal respiratory effort, no wheezes, crackles, rales  CV: RRR, S1S2, pain is not reproducible on palpation   Abdominal: Soft, NT, ND   Extremities: No edema,  Incisions:   Tubes:

## 2022-07-24 NOTE — CONSULT NOTE ADULT - ASSESSMENT
NSTEMI  Hypertensive urgency  CAD s/p CABG x5, PCI distal anastomosis free EDUIN to OM1 (2019)  HLD  HTN    -trop <0.01 -> 0.14 repeat and trend  -EKG nonischemic. Repeat, especially if chest pain  -resume ASA  -heparin drip  -c/w atorvastatin 80mg  -c/w fenofibrate  -c/w lisinopril  -can start labetalol 100mg BID  -keep NPO in event of cath necessity in AM   NSTEMI  Hypertensive urgency  CAD s/p CABG x5, PCI distal anastomosis free EDUIN to OM1 (2019)  HLD  HTN    -trop <0.01 -> 0.14 repeat and trend  -EKG nonischemic. Repeat, especially if chest pain  -resume ASA  -load with plavix and continue maintainance  -heparin drip  -c/w atorvastatin 80mg  -c/w fenofibrate  -c/w lisinopril  -can start labetalol 100mg BID for bp control  -keep NPO for cath in AM   NSTEMI  Hypertensive urgency  CAD s/p CABG x4 (2015), PCI distal anastomosis free EDUIN to OM1 (2019)  HLD  HTN    -trop <0.01 -> 0.14 -> 0.21  -EKG nonischemic thus far. Repeat, especially if chest pain  -resume ASA STAT  -load with plavix and continue maintainance dose. May need to switch to brilinta post-cath  -heparin drip  -c/w atorvastatin 80mg  -c/w fenofibrate  -c/w lisinopril  -can start labetalol 100mg BID for bp control  -keep NPO for cath in AM   NSTEMI  Hypertensive urgency  CAD s/p CABG x4 (2015), PCI distal anastomosis free EDUIN to OM1 (2019)  HLD  HTN    -trop <0.01 -> 0.14 -> 0.21  -EKG nonischemic thus far. Repeat, especially if chest pain  -resume ASA STAT  -load with plavix and continue maintainance dose. May need to switch to brilinta post-cath  -heparin drip  -c/w atorvastatin 80mg  -c/w fenofibrate  -increase to lisinopril 20mg  -can add nifedipine for bp control  -keep NPO for cath in AM

## 2022-07-25 LAB
APTT BLD: 33.6 SEC — SIGNIFICANT CHANGE UP (ref 27–39.2)
APTT BLD: 34.3 SEC — SIGNIFICANT CHANGE UP (ref 27–39.2)
INR BLD: 0.96 RATIO — SIGNIFICANT CHANGE UP (ref 0.65–1.3)
PROTHROM AB SERPL-ACNC: 11 SEC — SIGNIFICANT CHANGE UP (ref 9.95–12.87)
TROPONIN T SERPL-MCNC: 0.21 NG/ML — CRITICAL HIGH
TROPONIN T SERPL-MCNC: 0.52 NG/ML — CRITICAL HIGH

## 2022-07-25 PROCEDURE — 99233 SBSQ HOSP IP/OBS HIGH 50: CPT

## 2022-07-25 PROCEDURE — 93306 TTE W/DOPPLER COMPLETE: CPT | Mod: 26

## 2022-07-25 PROCEDURE — 93010 ELECTROCARDIOGRAM REPORT: CPT

## 2022-07-25 PROCEDURE — 93010 ELECTROCARDIOGRAM REPORT: CPT | Mod: 77

## 2022-07-25 RX ORDER — SODIUM CHLORIDE 9 MG/ML
1000 INJECTION INTRAMUSCULAR; INTRAVENOUS; SUBCUTANEOUS
Refills: 0 | Status: DISCONTINUED | OUTPATIENT
Start: 2022-07-25 | End: 2022-07-26

## 2022-07-25 RX ORDER — HYDRALAZINE HCL 50 MG
10 TABLET ORAL ONCE
Refills: 0 | Status: COMPLETED | OUTPATIENT
Start: 2022-07-25 | End: 2022-07-25

## 2022-07-25 RX ORDER — LISINOPRIL 2.5 MG/1
5 TABLET ORAL ONCE
Refills: 0 | Status: COMPLETED | OUTPATIENT
Start: 2022-07-25 | End: 2022-07-25

## 2022-07-25 RX ORDER — CLOPIDOGREL BISULFATE 75 MG/1
300 TABLET, FILM COATED ORAL ONCE
Refills: 0 | Status: COMPLETED | OUTPATIENT
Start: 2022-07-25 | End: 2022-07-25

## 2022-07-25 RX ORDER — CLOPIDOGREL BISULFATE 75 MG/1
75 TABLET, FILM COATED ORAL DAILY
Refills: 0 | Status: DISCONTINUED | OUTPATIENT
Start: 2022-07-26 | End: 2022-07-26

## 2022-07-25 RX ORDER — HEPARIN SODIUM 5000 [USP'U]/ML
6500 INJECTION INTRAVENOUS; SUBCUTANEOUS EVERY 8 HOURS
Refills: 0 | Status: DISCONTINUED | OUTPATIENT
Start: 2022-07-25 | End: 2022-07-26

## 2022-07-25 RX ADMIN — Medication 650 MILLIGRAM(S): at 05:47

## 2022-07-25 RX ADMIN — Medication 145 MILLIGRAM(S): at 17:48

## 2022-07-25 RX ADMIN — Medication 650 MILLIGRAM(S): at 20:35

## 2022-07-25 RX ADMIN — Medication 25 MILLIGRAM(S): at 17:49

## 2022-07-25 RX ADMIN — Medication 25 MILLIGRAM(S): at 05:45

## 2022-07-25 RX ADMIN — ATORVASTATIN CALCIUM 80 MILLIGRAM(S): 80 TABLET, FILM COATED ORAL at 21:37

## 2022-07-25 RX ADMIN — CLOPIDOGREL BISULFATE 300 MILLIGRAM(S): 75 TABLET, FILM COATED ORAL at 05:44

## 2022-07-25 RX ADMIN — LISINOPRIL 5 MILLIGRAM(S): 2.5 TABLET ORAL at 01:11

## 2022-07-25 RX ADMIN — ESCITALOPRAM OXALATE 20 MILLIGRAM(S): 10 TABLET, FILM COATED ORAL at 17:48

## 2022-07-25 RX ADMIN — SODIUM CHLORIDE 100 MILLILITER(S): 9 INJECTION INTRAMUSCULAR; INTRAVENOUS; SUBCUTANEOUS at 15:43

## 2022-07-25 RX ADMIN — HEPARIN SODIUM 1000 UNIT(S)/HR: 5000 INJECTION INTRAVENOUS; SUBCUTANEOUS at 02:02

## 2022-07-25 RX ADMIN — PANTOPRAZOLE SODIUM 40 MILLIGRAM(S): 20 TABLET, DELAYED RELEASE ORAL at 06:46

## 2022-07-25 RX ADMIN — Medication 650 MILLIGRAM(S): at 21:35

## 2022-07-25 RX ADMIN — Medication 81 MILLIGRAM(S): at 15:42

## 2022-07-25 RX ADMIN — Medication 10 MILLIGRAM(S): at 01:11

## 2022-07-25 RX ADMIN — LISINOPRIL 5 MILLIGRAM(S): 2.5 TABLET ORAL at 05:46

## 2022-07-25 NOTE — PROGRESS NOTE ADULT - ASSESSMENT
Pt is a 52 y/o male with PMH of CAD s/p PCI and CABG (follows with Dr. Ruiz), HTN, HLD and depression presenting for chest pain x 4 hrs. Pt says he was at home setting up an electric bike when he felt midsternal chest pain that radiates to bilateral scapula. Worsens with exertion. Associated with mild SOB. Took 3 baby ASAs this morning. No fever, cough, nausea/vomiting or lightheadedness    # Chest pain NSTEMI , s/p distal LIMA Graft stenting/   # Aortic dissection ruled out    # H/O CAD s/p PCI and CABG  no active chest pain at the time of the encounter   troponin trended up, Patient went for heart cath and he got stenting of distal LIMA graft , Implants: 3.0 X 12 Synergy XD drug-eluting stent   Echo EF  is 55 to 60%.  Basal and mid inferior septum and basal anteroseptal segment are abnormal,  Mild tricuspid regurgitation.  AS per cardiology c/w ASA,  started  Plavix 75mg daily,  c/w metoprolol  - c/w high intensity statin   IV Fluids: start NS@100cc/hr for 6 hrs.  - c/w  lisinopril   -follow up tropnonnin and EKG   Cardiac monitor     # HTN, HLD, Depression, GERD  - c/w home meds    # DVT ppx     pending: clinical monitoring after stenting- 24-48 hours possible d/c            Pt is a 50 y/o male with PMH of CAD s/p PCI and CABG (follows with Dr. Ruiz), HTN, HLD and depression presenting for chest pain x 4 hrs. Pt says he was at home setting up an electric bike when he felt midsternal chest pain that radiates to bilateral scapula. Worsens with exertion. Associated with mild SOB. Took 3 baby ASAs this morning. No fever, cough, nausea/vomiting or lightheadedness    # Chest pain NSTEMI , s/p distal LIMA Graft stenting/   # Aortic dissection ruled out    # H/O CAD s/p PCI and CABG  no active chest pain at the time of the encounter   troponin trended up, Patient went for heart cath and he got stenting of distal LIMA graft , Implants: 3.0 X 12 Synergy XD drug-eluting stent   Echo EF  is 55 to 60%.  Basal and mid inferior septum and basal anteroseptal segment are abnormal,  Mild tricuspid regurgitation.  AS per cardiology c/w ASA,  started  Plavix 75mg daily,  c/w metoprolol  - c/w high intensity statin   IV Fluids: start NS@100cc/hr for 6 hrs.  - c/w  lisinopril   -follow up tropnonnin and EKG   Cardiac monitor   monitor R femora access area for bleeding or hematoma  monitor kidney function     # HTN, HLD, Depression, GERD  - c/w home meds    # DVT ppx     pending: clinical monitoring after stenting- 24-48 hours possible d/c

## 2022-07-25 NOTE — CHART NOTE - NSCHARTNOTEFT_GEN_A_CORE
Pre cath note:    indication:  [ ] STEMI                [ x] NSTEMI                 [ ] Acute coronary syndrome                     [ ]Unstable Angina   [ ] high risk  [ ] intermediate risk  [ ] low risk                     [ ] Stable Angina     non-invasive testing:                          Date:                     result: [ ] high risk  [ ] intermediate risk  [ ] low risk    Anti- Anginal medications:                    [ ] not used                       [x ] used                   [ ] not used but strong indication not to use    Ejection Fraction                   [ ] <29            [ ] 30-39%   [ ] 40-49%     [x ]>50%    CHF                   [ ] active (within last 14 days on meds   [ ] Chronic (on meds but no exacerbation)    COPD                   [ ] mild (on chronic bronchodilators)  [ ] moderate (on chronic steroid therapy)      [ ] severe (indication for home O2 or PACO2 >50)    Other risk factors:                       [x ] Previous MI                     [ ] CVA/ stroke                    [ ] carotid stent/ CEA                    [ ] PVD/PAD- (arterial aneurysm, non-palpable pulses, tortuous vessel with inability to insert catheter, infra-renal dissection, renal or subclavian artery stenosis)                    [ ] diabetic                    [x ] previous CABG                    [ ] Renal Failure                           14.0   5.71  )-----------( 233      ( 24 Jul 2022 15:36 )             40.5     07-24    142  |  103  |  16  ----------------------------<  98  4.1   |  28  |  0.8    Ca    9.4      24 Jul 2022 15:36  Mg     1.8     07-24    TPro  6.6  /  Alb  4.4  /  TBili  0.3  /  DBili  x   /  AST  26  /  ALT  55<H>  /  AlkPhos  76  07-24                         -Patient Scheduled for LHC/PCI today  -Keep NPO   -Hold Anticoagulation prior to PCI  -Start IV Fluids NS at : [ ] 3ml/Kg for 1 Hr prior to procedure                                         [ ]1ml/KG for 1 Hr prior to Procedure if patient has signs of Heart Failure                                          [x ] 250cc NS bolus over 1 hour in pre-op cath

## 2022-07-25 NOTE — PATIENT PROFILE ADULT - FALL HARM RISK - HARM RISK INTERVENTIONS

## 2022-07-25 NOTE — PROGRESS NOTE ADULT - SUBJECTIVE AND OBJECTIVE BOX
AMARIMIGDALIA  51y, Male  Allergy: No Known Allergies    Hospital Day: 1d    Patient seen and examined  today after the procedure in 3C, he stated that he feels much better after the procedure, no active chest pain now     PMH/PSH:  PAST MEDICAL & SURGICAL HISTORY:  Hypertension, unspecified type      High cholesterol      Coronary artery disease      History of appendectomy      History of quadruple bypass          LAST 24-Hr EVENTS:    VITALS:  T(F): 95.4 (07-25-22 @ 14:45), Max: 96.1 (07-25-22 @ 08:08)  HR: 67 (07-25-22 @ 14:45)  BP: 129/72 (07-25-22 @ 14:45) (129/72 - 180/105)  RR: 18 (07-25-22 @ 14:45)  SpO2: 99% (07-25-22 @ 08:08)          TESTS & MEASUREMENTS:  Weight/BMI  111.1 (07-25-22 @ 14:45)  34.2 (07-25-22 @ 14:45)    07-24-22 @ 07:01  -  07-25-22 @ 07:00  --------------------------------------------------------  IN: 20 mL / OUT: 0 mL / NET: 20 mL                            14.0   5.71  )-----------( 233      ( 24 Jul 2022 15:36 )             40.5     PTT - ( 25 Jul 2022 00:45 )  PTT:34.3 sec    07-24    142  |  103  |  16  ----------------------------<  98  4.1   |  28  |  0.8    Ca    9.4      24 Jul 2022 15:36  Mg     1.8     07-24    TPro  6.6  /  Alb  4.4  /  TBili  0.3  /  DBili  x   /  AST  26  /  ALT  55<H>  /  AlkPhos  76  07-24    LIVER FUNCTIONS - ( 24 Jul 2022 15:36 )  Alb: 4.4 g/dL / Pro: 6.6 g/dL / ALK PHOS: 76 U/L / ALT: 55 U/L / AST: 26 U/L / GGT: x           CARDIAC MARKERS ( 25 Jul 2022 00:45 )  x     / 0.21 ng/mL / x     / x     / x      CARDIAC MARKERS ( 24 Jul 2022 21:48 )  x     / 0.14 ng/mL / x     / x     / x      CARDIAC MARKERS ( 24 Jul 2022 15:36 )  x     / <0.01 ng/mL / x     / x     / x                    Serum Pro-Brain Natriuretic Peptide: 51 pg/mL (07-24-22 @ 15:36)    COVID-19 PCR: NotDetec (07-24-22 @ 16:56)                RADIOLOGY, ECG, & ADDITIONAL TESTS:  12 Lead ECG:   Ventricular Rate 64 BPM    Atrial Rate 64 BPM    P-R Interval 162 ms    QRS Duration 88 ms    Q-T Interval 406 ms    QTC Calculation(Bazett) 418 ms    P Axis 40 degrees    R Axis 62 degrees    T Axis 35 degrees    Diagnosis Line Normal sinus rhythm  Normal ECG    Confirmed by Manuel Christie ([id number]) on 7/25/2022 12:33:07 PM (07-25-22 @ 04:27)      RECENT DIAGNOSTIC ORDERS:  Activated Partial Thromboplastin Time:  Start Date:25-Jul-2022. 16:00 (07-25-22 @ 11:59)  Prothrombin Time and INR, Plasma:  Start Date:25-Jul-2022. 16:00 (07-25-22 @ 11:59)  Diet, DASH/TLC:   Sodium & Cholesterol Restricted     Special Instructions for Nursing:  Sodium & Cholesterol Restricted (07-25-22 @ 11:46)  Cardiac Catheterization (07-25-22 @ 10:43)  Troponin T, Serum: 16:00 (07-25-22 @ 08:58)  Troponin T, Serum: 11:00 (07-25-22 @ 08:58)  Activated Partial Thromboplastin Time:  Start Date:25-Jul-2022. STAT (07-25-22 @ 08:53)  Basic Metabolic Panel: AM Sched. Collection: 26-Jul-2022 04:30 (07-25-22 @ 07:58)  Complete Blood Count: AM Sched. Collection: 26-Jul-2022 04:30 (07-25-22 @ 07:58)  Complete Blood Count: STAT (07-25-22 @ 07:36)  Complete Blood Count: Repeat From: 26-Jul-2022 00:00 To: 28-Jul-2022 04:30, Every 1 day(s) (07-25-22 @ 07:36)  Complete Blood Count: Repeat From: 29-Jul-2022 00:00 To: 31-Jul-2022 04:30, Every 1 day(s) (07-25-22 @ 07:36)  Complete Blood Count: Repeat From: 01-Aug-2022 00:00 To: 03-Aug-2022 04:30, Every 1 day(s) (07-25-22 @ 07:36)  Complete Blood Count: Repeat From: 04-Aug-2022 00:00 To: 06-Aug-2022 04:30, Every 1 day(s) (07-25-22 @ 07:36)  Complete Blood Count: Repeat From: 07-Aug-2022 00:00 To: 09-Aug-2022 04:30, Every 1 day(s) (07-25-22 @ 07:36)  Activated Partial Thromboplastin Time:  Start Date:25-Jul-2022. STAT (07-25-22 @ 07:36)  12 Lead ECG (07-24-22 @ 23:14)      MEDICATIONS:  MEDICATIONS  (STANDING):  aspirin  chewable 81 milliGRAM(s) Oral daily  atorvastatin 80 milliGRAM(s) Oral at bedtime  escitalopram 20 milliGRAM(s) Oral daily  fenofibrate Tablet 145 milliGRAM(s) Oral daily  lisinopril 5 milliGRAM(s) Oral daily  metoprolol tartrate 25 milliGRAM(s) Oral two times a day  pantoprazole    Tablet 40 milliGRAM(s) Oral before breakfast  sodium chloride 0.9%. 1000 milliLiter(s) (100 mL/Hr) IV Continuous <Continuous>    MEDICATIONS  (PRN):  acetaminophen     Tablet .. 650 milliGRAM(s) Oral every 6 hours PRN Temp greater or equal to 38C (100.4F), Mild Pain (1 - 3)  labetalol Injectable 10 milliGRAM(s) IV Push once PRN SBP > 160  melatonin 3 milliGRAM(s) Oral at bedtime PRN Insomnia  morphine  - Injectable 2 milliGRAM(s) IV Push every 6 hours PRN Moderate Pain (4 - 6)      HOME MEDICATIONS:  aspirin (07-24)  fenofibrate 145 mg oral tablet (07-24)  Lexapro (07-24)  lisinopril 5 mg oral tablet (07-24)  metoprolol (07-24)  pantoprazole 40 mg oral delayed release tablet (07-24)  rosuvastatin 40 mg oral tablet (07-24)      PHYSICAL EXAM:  GENERAL:   CHEST/LUNG:   HEART:   ABDOMEN:   EXTREMITIES:               AMARIMIGDALIA  51y, Male  Allergy: No Known Allergies    Hospital Day: 1d    Patient seen and examined  today after the procedure in 3C, he stated that he feels much better after the procedure, no active chest pain now or sob     PMH/PSH:  PAST MEDICAL & SURGICAL HISTORY:  Hypertension, unspecified type      High cholesterol      Coronary artery disease      History of appendectomy      History of quadruple bypass          LAST 24-Hr EVENTS:    VITALS:  T(F): 95.4 (07-25-22 @ 14:45), Max: 96.1 (07-25-22 @ 08:08)  HR: 67 (07-25-22 @ 14:45)  BP: 129/72 (07-25-22 @ 14:45) (129/72 - 180/105)  RR: 18 (07-25-22 @ 14:45)  SpO2: 99% (07-25-22 @ 08:08)          TESTS & MEASUREMENTS:  Weight/BMI  111.1 (07-25-22 @ 14:45)  34.2 (07-25-22 @ 14:45)    07-24-22 @ 07:01  -  07-25-22 @ 07:00  --------------------------------------------------------  IN: 20 mL / OUT: 0 mL / NET: 20 mL                            14.0   5.71  )-----------( 233      ( 24 Jul 2022 15:36 )             40.5     PTT - ( 25 Jul 2022 00:45 )  PTT:34.3 sec    07-24    142  |  103  |  16  ----------------------------<  98  4.1   |  28  |  0.8    Ca    9.4      24 Jul 2022 15:36  Mg     1.8     07-24    TPro  6.6  /  Alb  4.4  /  TBili  0.3  /  DBili  x   /  AST  26  /  ALT  55<H>  /  AlkPhos  76  07-24    LIVER FUNCTIONS - ( 24 Jul 2022 15:36 )  Alb: 4.4 g/dL / Pro: 6.6 g/dL / ALK PHOS: 76 U/L / ALT: 55 U/L / AST: 26 U/L / GGT: x           CARDIAC MARKERS ( 25 Jul 2022 00:45 )  x     / 0.21 ng/mL / x     / x     / x      CARDIAC MARKERS ( 24 Jul 2022 21:48 )  x     / 0.14 ng/mL / x     / x     / x      CARDIAC MARKERS ( 24 Jul 2022 15:36 )  x     / <0.01 ng/mL / x     / x     / x                    Serum Pro-Brain Natriuretic Peptide: 51 pg/mL (07-24-22 @ 15:36)    COVID-19 PCR: NotDetec (07-24-22 @ 16:56)                RADIOLOGY, ECG, & ADDITIONAL TESTS:  12 Lead ECG:   Ventricular Rate 64 BPM    Atrial Rate 64 BPM    P-R Interval 162 ms    QRS Duration 88 ms    Q-T Interval 406 ms    QTC Calculation(Bazett) 418 ms    P Axis 40 degrees    R Axis 62 degrees    T Axis 35 degrees    Diagnosis Line Normal sinus rhythm  Normal ECG    Confirmed by Manuel Christie ([id number]) on 7/25/2022 12:33:07 PM (07-25-22 @ 04:27)      RECENT DIAGNOSTIC ORDERS:  Activated Partial Thromboplastin Time:  Start Date:25-Jul-2022. 16:00 (07-25-22 @ 11:59)  Prothrombin Time and INR, Plasma:  Start Date:25-Jul-2022. 16:00 (07-25-22 @ 11:59)  Diet, DASH/TLC:   Sodium & Cholesterol Restricted     Special Instructions for Nursing:  Sodium & Cholesterol Restricted (07-25-22 @ 11:46)  Cardiac Catheterization (07-25-22 @ 10:43)  Troponin T, Serum: 16:00 (07-25-22 @ 08:58)  Troponin T, Serum: 11:00 (07-25-22 @ 08:58)  Activated Partial Thromboplastin Time:  Start Date:25-Jul-2022. STAT (07-25-22 @ 08:53)  Basic Metabolic Panel: AM Sched. Collection: 26-Jul-2022 04:30 (07-25-22 @ 07:58)  Complete Blood Count: AM Sched. Collection: 26-Jul-2022 04:30 (07-25-22 @ 07:58)  Complete Blood Count: STAT (07-25-22 @ 07:36)  Complete Blood Count: Repeat From: 26-Jul-2022 00:00 To: 28-Jul-2022 04:30, Every 1 day(s) (07-25-22 @ 07:36)  Complete Blood Count: Repeat From: 29-Jul-2022 00:00 To: 31-Jul-2022 04:30, Every 1 day(s) (07-25-22 @ 07:36)  Complete Blood Count: Repeat From: 01-Aug-2022 00:00 To: 03-Aug-2022 04:30, Every 1 day(s) (07-25-22 @ 07:36)  Complete Blood Count: Repeat From: 04-Aug-2022 00:00 To: 06-Aug-2022 04:30, Every 1 day(s) (07-25-22 @ 07:36)  Complete Blood Count: Repeat From: 07-Aug-2022 00:00 To: 09-Aug-2022 04:30, Every 1 day(s) (07-25-22 @ 07:36)  Activated Partial Thromboplastin Time:  Start Date:25-Jul-2022. STAT (07-25-22 @ 07:36)  12 Lead ECG (07-24-22 @ 23:14)      MEDICATIONS:  MEDICATIONS  (STANDING):  aspirin  chewable 81 milliGRAM(s) Oral daily  atorvastatin 80 milliGRAM(s) Oral at bedtime  escitalopram 20 milliGRAM(s) Oral daily  fenofibrate Tablet 145 milliGRAM(s) Oral daily  lisinopril 5 milliGRAM(s) Oral daily  metoprolol tartrate 25 milliGRAM(s) Oral two times a day  pantoprazole    Tablet 40 milliGRAM(s) Oral before breakfast  sodium chloride 0.9%. 1000 milliLiter(s) (100 mL/Hr) IV Continuous <Continuous>    MEDICATIONS  (PRN):  acetaminophen     Tablet .. 650 milliGRAM(s) Oral every 6 hours PRN Temp greater or equal to 38C (100.4F), Mild Pain (1 - 3)  labetalol Injectable 10 milliGRAM(s) IV Push once PRN SBP > 160  melatonin 3 milliGRAM(s) Oral at bedtime PRN Insomnia  morphine  - Injectable 2 milliGRAM(s) IV Push every 6 hours PRN Moderate Pain (4 - 6)      HOME MEDICATIONS:  aspirin (07-24)  fenofibrate 145 mg oral tablet (07-24)  Lexapro (07-24)  lisinopril 5 mg oral tablet (07-24)  metoprolol (07-24)  pantoprazole 40 mg oral delayed release tablet (07-24)  rosuvastatin 40 mg oral tablet (07-24)      PHYSICAL EXAM:  GENERAL: awake, alert, NAD   CHEST/LUNG: Clear to auscultaions   HEART:  regular rhythm   ABDOMEN:  soft, non tender, R femoral access dressing, no active bleeding or hematoma noted   EXTREMITIES:  no edema

## 2022-07-25 NOTE — ED ADULT NURSE REASSESSMENT NOTE - NS ED NURSE REASSESS COMMENT FT1
Spoke with  to put back in orders for blood work (PTT/Trop/CBC) as patient has been transferred to Cath lab. ER RN unable to complete blood work at this time.

## 2022-07-25 NOTE — CHART NOTE - NSCHARTNOTEFT_GEN_A_CORE
PRE-OP DIAGNOSIS:  Unstable Angina (AUC 7)      PROCEDURE:     [x] Coronary Angiogram     [x] LHC     [] LVG     [] RHC     [] Intervention (see below)         PHYSICIAN:  Dr. Sara Zavaleta    ASSISTANT:  Dr. CARLITOS Braxton       PROCEDURE DESCRIPTION:     Consent:      [x] Patient     [] Family Member     []  Used        Anesthesia:     [] General     [x] Sedation     [x] Local        Access & Closure:     [] Fr Radial Artery     [x] 6 Fr right Femoral Artery; preclose    [] Fr Femoral Vein     [] Fr Brachial Vein       IV Contrast: 100 mL        Intervention: successful stenting of distal LIMA graft       Implants: 3.0 X 12 Synergy XD drug-eluting stent        FINDINGS:     Coronary Dominance:       LM:     LAD:     CX:     Ramus:     RCA:     LIMA:     SVG:        LVEDP: 23 mmHg      ESTIMATED BLOOD LOSS: < 10 mL        CONDITION:     [x] Good     [] Fair     [] Critical        SPECIMEN REMOVED: N/A       POST-OP DIAGNOSIS:      [] Normal Coronary Angiogram     [] Mild Coronary Artery Disease (< 50% stenosis)     [] __ Vessel Coronary Artery Disease        PLAN OF CARE:     [] D/C Home Today     [x] Return to In-patient bed     [] Admit for observation     [] Return for Staged Procedure     [] CT Surgery Consult     [] Medications:   - c/w ASA  - start Plavix 75mg daily   - c/w metoprolol  - c/w high intensity statin     [] IV Fluids: start NS@100cc/hr for 6 hrs PRE-OP DIAGNOSIS:  Unstable Angina (AUC 7)      PROCEDURE:     [x] Coronary Angiogram     [x] LHC     [] LVG     [] RHC     [] Intervention (see below)         PHYSICIAN:  Dr. Sweeney, Dr. Ruiz    ASSISTANT:  Dr. CARLITOS Braxton       PROCEDURE DESCRIPTION:     Consent:      [x] Patient     [] Family Member     []  Used        Anesthesia:     [] General     [x] Sedation     [x] Local        Access & Closure:     [] Fr Radial Artery     [x] 6 Fr right Femoral Artery; preclose    [] Fr Femoral Vein     [] Fr Brachial Vein       IV Contrast: 100 mL        Intervention: successful stenting of distal LIMA graft       Implants: 3.0 X 12 Synergy XD drug-eluting stent        FINDINGS:     Coronary Dominance:       LM: normal    LAD:   Mid LAD: 100% , distal vessel supplied by patent LIMA  Distal LAD 80% hazy stenosis s/p pci with one MAEVE to 0%  Patent stents in D1    CX: Cx mild diffuse disease  OM1 100% occluded supplied by patent SVG  Patent stents in LIMA and SVG to OM1    Ramus:     RCA: 100% chronic total occlusion, distal vessel supplied by patent SVG    LIMA: as above     SVG: as above      LVEDP: 23 mmHg      ESTIMATED BLOOD LOSS: < 10 mL        CONDITION:     [x] Good     [] Fair     [] Critical        SPECIMEN REMOVED: N/A       POST-OP DIAGNOSIS:      [] Normal Coronary Angiogram     [] Mild Coronary Artery Disease (< 50% stenosis)     [] __ Vessel Coronary Artery Disease        PLAN OF CARE:     [] D/C Home Today     [x] Return to In-patient bed     [] Admit for observation     [] Return for Staged Procedure     [] CT Surgery Consult     [] Medications:   - c/w ASA  - start Plavix 75mg daily   - c/w metoprolol  - c/w high intensity statin     [] IV Fluids: start NS@100cc/hr for 6 hrs PRE-OP DIAGNOSIS:  Unstable Angina (AUC 7)      PROCEDURE:     [x] Coronary Angiogram     [x] LHC     [] LVG     [] RHC     [] Intervention (see below)         PHYSICIAN:  Dr. Sweeney, Dr. Ruiz    ASSISTANT:  Dr. CARLITOS Braxton       PROCEDURE DESCRIPTION:     Consent:      [x] Patient     [] Family Member     []  Used        Anesthesia:     [] General     [x] Sedation     [x] Local        Access & Closure:     [] Fr Radial Artery     [x] 6 Fr right Femoral Artery; preclose    [] Fr Femoral Vein     [] Fr Brachial Vein       IV Contrast: 100 mL        Intervention: successful stenting of distal LIMA graft       Implants: 3.0 X 12 Synergy XD drug-eluting stent        FINDINGS:     Coronary Dominance:       LM: normal    LAD:   Mid LAD: 100% , distal vessel supplied by patent LIMA  Distal LAD 80% hazy stenosis s/p pci with one MAEVE to 0%  Patent stents in D1    CX: Cx mild diffuse disease  OM1 100% occluded supplied by patent SVG  Patent stents in LIMA and SVG to OM1    RCA: 100% chronic total occlusion, distal vessel supplied by patent SVG    LIMA: as above     SVG: as above      LVEDP: 23 mmHg      ESTIMATED BLOOD LOSS: < 10 mL        CONDITION:     [x] Good     [] Fair     [] Critical        SPECIMEN REMOVED: N/A       POST-OP DIAGNOSIS:      [] Normal Coronary Angiogram     [] Mild Coronary Artery Disease (< 50% stenosis)     [] __ Vessel Coronary Artery Disease        PLAN OF CARE:     [] D/C Home Today     [x] Return to In-patient bed     [] Admit for observation     [] Return for Staged Procedure     [] CT Surgery Consult     [] Medications:   - c/w ASA  - start Plavix 75mg daily   - c/w metoprolol  - c/w high intensity statin     [] IV Fluids: start NS@100cc/hr for 6 hrs PRE-OP DIAGNOSIS:  NSTEMI (AUC 8)      PROCEDURE:     [x] Coronary Angiogram     [x] LHC     [] LVG     [] RHC     [] Intervention (see below)         PHYSICIAN:  Dr. Sweeney, Dr. Ruiz    ASSISTANT:  Dr. CARLITOS Braxton       PROCEDURE DESCRIPTION:     Consent:      [x] Patient     [] Family Member     []  Used        Anesthesia:     [] General     [x] Sedation     [x] Local        Access & Closure:     [] Fr Radial Artery     [x] 6 Fr right Femoral Artery; preclose    [] Fr Femoral Vein     [] Fr Brachial Vein       IV Contrast: 100 mL        Intervention: successful stenting of distal LIMA graft       Implants: 3.0 X 12 Synergy XD drug-eluting stent        FINDINGS:     Coronary Dominance:       LM: normal    LAD:   Mid LAD: 100% , distal vessel supplied by patent LIMA  Distal LAD 80% hazy stenosis s/p pci with one MAEVE to 0%  Patent stents in D1    CX: Cx mild diffuse disease  OM1 100% occluded supplied by patent SVG  Patent stents in LIMA and SVG to OM1    RCA: 100% chronic total occlusion, distal vessel supplied by patent SVG    LIMA: as above     SVG: as above      LVEDP: 23 mmHg      ESTIMATED BLOOD LOSS: < 10 mL        CONDITION:     [x] Good     [] Fair     [] Critical        SPECIMEN REMOVED: N/A       POST-OP DIAGNOSIS:      [] Normal Coronary Angiogram     [] Mild Coronary Artery Disease (< 50% stenosis)     [] __ Vessel Coronary Artery Disease        PLAN OF CARE:     [] D/C Home Today     [x] Return to In-patient bed     [] Admit for observation     [] Return for Staged Procedure     [] CT Surgery Consult     [] Medications:   - c/w ASA  - start Plavix 75mg daily   - c/w metoprolol  - c/w high intensity statin     [] IV Fluids: start NS@100cc/hr for 6 hrs PRE-OP DIAGNOSIS:  NSTEMI (AUC 8)      PROCEDURE:     [x] Coronary Angiogram     [x] LHC     [] LVG     [] RHC     [] Intervention (see below)         PHYSICIAN:  Dr. Sweeney, Dr. Ruiz    ASSISTANT:  Dr. CARLITOS Braxton       PROCEDURE DESCRIPTION:     Consent:      [x] Patient     [] Family Member     []  Used        Anesthesia:     [] General     [x] Sedation     [x] Local        Access & Closure:     [] Fr Radial Artery     [x] 6 Fr right Femoral Artery; preclose    [] Fr Femoral Vein     [] Fr Brachial Vein       IV Contrast: 100 mL        Intervention: successful stenting of distal LIMA graft       Implants: 3.0 X 12 Synergy XD drug-eluting stent        FINDINGS:     Coronary Dominance: right      LM: normal    LAD:   Mid LAD: 100% , distal vessel supplied by patent LIMA  Distal LAD 80% hazy stenosis s/p pci with one MAEVE to 0%  Patent stents in D1    CX: Cx mild diffuse disease  OM1 100% occluded supplied by patent SVG  Patent stents in LIMA and SVG to OM1    RCA: 100% chronic total occlusion, distal vessel supplied by patent SVG    LIMA: as above     SVG: as above      LVEDP: 23 mmHg      ESTIMATED BLOOD LOSS: < 10 mL        CONDITION:     [x] Good     [] Fair     [] Critical        SPECIMEN REMOVED: N/A       POST-OP DIAGNOSIS:      [] Normal Coronary Angiogram     [] Mild Coronary Artery Disease (< 50% stenosis)     [x] _3_ Vessel Coronary Artery Disease (Syntax Score 30)       PLAN OF CARE:     [] D/C Home Today     [x] Return to In-patient bed     [] Admit for observation     [] Return for Staged Procedure     [] CT Surgery Consult     [] Medications:   - c/w ASA  - start Plavix 75mg daily   - c/w metoprolol  - c/w high intensity statin     [] IV Fluids: start NS@100cc/hr for 6 hrs

## 2022-07-26 ENCOUNTER — TRANSCRIPTION ENCOUNTER (OUTPATIENT)
Age: 52
End: 2022-07-26

## 2022-07-26 VITALS — HEART RATE: 70 BPM | SYSTOLIC BLOOD PRESSURE: 116 MMHG | DIASTOLIC BLOOD PRESSURE: 77 MMHG

## 2022-07-26 LAB
ANION GAP SERPL CALC-SCNC: 11 MMOL/L — SIGNIFICANT CHANGE UP (ref 7–14)
ANION GAP SERPL CALC-SCNC: 12 MMOL/L — SIGNIFICANT CHANGE UP (ref 7–14)
BUN SERPL-MCNC: 13 MG/DL — SIGNIFICANT CHANGE UP (ref 10–20)
BUN SERPL-MCNC: 14 MG/DL — SIGNIFICANT CHANGE UP (ref 10–20)
CALCIUM SERPL-MCNC: 9 MG/DL — SIGNIFICANT CHANGE UP (ref 8.5–10.1)
CALCIUM SERPL-MCNC: 9.1 MG/DL — SIGNIFICANT CHANGE UP (ref 8.5–10.1)
CHLORIDE SERPL-SCNC: 106 MMOL/L — SIGNIFICANT CHANGE UP (ref 98–110)
CHLORIDE SERPL-SCNC: 106 MMOL/L — SIGNIFICANT CHANGE UP (ref 98–110)
CO2 SERPL-SCNC: 23 MMOL/L — SIGNIFICANT CHANGE UP (ref 17–32)
CO2 SERPL-SCNC: 24 MMOL/L — SIGNIFICANT CHANGE UP (ref 17–32)
CREAT SERPL-MCNC: 0.8 MG/DL — SIGNIFICANT CHANGE UP (ref 0.7–1.5)
CREAT SERPL-MCNC: 0.8 MG/DL — SIGNIFICANT CHANGE UP (ref 0.7–1.5)
EGFR: 106 ML/MIN/1.73M2 — SIGNIFICANT CHANGE UP
EGFR: 106 ML/MIN/1.73M2 — SIGNIFICANT CHANGE UP
GLUCOSE SERPL-MCNC: 101 MG/DL — HIGH (ref 70–99)
GLUCOSE SERPL-MCNC: 106 MG/DL — HIGH (ref 70–99)
HCT VFR BLD CALC: 39.9 % — LOW (ref 42–52)
HCT VFR BLD CALC: 41 % — LOW (ref 42–52)
HGB BLD-MCNC: 13.9 G/DL — LOW (ref 14–18)
HGB BLD-MCNC: 14.1 G/DL — SIGNIFICANT CHANGE UP (ref 14–18)
MCHC RBC-ENTMCNC: 31.2 PG — HIGH (ref 27–31)
MCHC RBC-ENTMCNC: 31.5 PG — HIGH (ref 27–31)
MCHC RBC-ENTMCNC: 34.4 G/DL — SIGNIFICANT CHANGE UP (ref 32–37)
MCHC RBC-ENTMCNC: 34.8 G/DL — SIGNIFICANT CHANGE UP (ref 32–37)
MCV RBC AUTO: 90.5 FL — SIGNIFICANT CHANGE UP (ref 80–94)
MCV RBC AUTO: 90.7 FL — SIGNIFICANT CHANGE UP (ref 80–94)
NRBC # BLD: 0 /100 WBCS — SIGNIFICANT CHANGE UP (ref 0–0)
NRBC # BLD: 0 /100 WBCS — SIGNIFICANT CHANGE UP (ref 0–0)
PLATELET # BLD AUTO: 195 K/UL — SIGNIFICANT CHANGE UP (ref 130–400)
PLATELET # BLD AUTO: 221 K/UL — SIGNIFICANT CHANGE UP (ref 130–400)
POTASSIUM SERPL-MCNC: 4.3 MMOL/L — SIGNIFICANT CHANGE UP (ref 3.5–5)
POTASSIUM SERPL-MCNC: 4.3 MMOL/L — SIGNIFICANT CHANGE UP (ref 3.5–5)
POTASSIUM SERPL-SCNC: 4.3 MMOL/L — SIGNIFICANT CHANGE UP (ref 3.5–5)
POTASSIUM SERPL-SCNC: 4.3 MMOL/L — SIGNIFICANT CHANGE UP (ref 3.5–5)
RBC # BLD: 4.41 M/UL — LOW (ref 4.7–6.1)
RBC # BLD: 4.52 M/UL — LOW (ref 4.7–6.1)
RBC # FLD: 12.5 % — SIGNIFICANT CHANGE UP (ref 11.5–14.5)
RBC # FLD: 12.6 % — SIGNIFICANT CHANGE UP (ref 11.5–14.5)
SODIUM SERPL-SCNC: 141 MMOL/L — SIGNIFICANT CHANGE UP (ref 135–146)
SODIUM SERPL-SCNC: 141 MMOL/L — SIGNIFICANT CHANGE UP (ref 135–146)
TROPONIN T SERPL-MCNC: 0.53 NG/ML — CRITICAL HIGH
TROPONIN T SERPL-MCNC: 0.58 NG/ML — CRITICAL HIGH
WBC # BLD: 6.95 K/UL — SIGNIFICANT CHANGE UP (ref 4.8–10.8)
WBC # BLD: 7.43 K/UL — SIGNIFICANT CHANGE UP (ref 4.8–10.8)
WBC # FLD AUTO: 6.95 K/UL — SIGNIFICANT CHANGE UP (ref 4.8–10.8)
WBC # FLD AUTO: 7.43 K/UL — SIGNIFICANT CHANGE UP (ref 4.8–10.8)

## 2022-07-26 PROCEDURE — 99239 HOSP IP/OBS DSCHRG MGMT >30: CPT

## 2022-07-26 PROCEDURE — 93010 ELECTROCARDIOGRAM REPORT: CPT

## 2022-07-26 RX ORDER — CLOPIDOGREL BISULFATE 75 MG/1
1 TABLET, FILM COATED ORAL
Qty: 0 | Refills: 0 | DISCHARGE
Start: 2022-07-26

## 2022-07-26 RX ADMIN — PANTOPRAZOLE SODIUM 40 MILLIGRAM(S): 20 TABLET, DELAYED RELEASE ORAL at 05:47

## 2022-07-26 RX ADMIN — HEPARIN SODIUM 6500 UNIT(S): 5000 INJECTION INTRAVENOUS; SUBCUTANEOUS at 14:45

## 2022-07-26 RX ADMIN — CLOPIDOGREL BISULFATE 75 MILLIGRAM(S): 75 TABLET, FILM COATED ORAL at 12:06

## 2022-07-26 RX ADMIN — Medication 145 MILLIGRAM(S): at 12:06

## 2022-07-26 RX ADMIN — ESCITALOPRAM OXALATE 20 MILLIGRAM(S): 10 TABLET, FILM COATED ORAL at 12:06

## 2022-07-26 RX ADMIN — HEPARIN SODIUM 6500 UNIT(S): 5000 INJECTION INTRAVENOUS; SUBCUTANEOUS at 05:47

## 2022-07-26 RX ADMIN — LISINOPRIL 5 MILLIGRAM(S): 2.5 TABLET ORAL at 05:47

## 2022-07-26 RX ADMIN — Medication 25 MILLIGRAM(S): at 05:47

## 2022-07-26 RX ADMIN — Medication 81 MILLIGRAM(S): at 12:06

## 2022-07-26 NOTE — DISCHARGE NOTE PROVIDER - HOSPITAL COURSE
Pt is a 50 y/o male with PMH of CAD s/p PCI and CABG (follows with Dr. Ruiz), HTN, HLD and depression presenting for chest pain since the AM. Pt says he was at home setting up an electric bike when he felt midsternal chest pain that radiates to bilateral scapula. Worsens with exertion. Associated with mild SOB. It felt like his previous heart attack and not like acid reflux which he suffers one. Took 3 baby ASAs this morning with no relief. With minimal improvement in symptoms he presented to the ED. Pt reports No fever, cough, nausea/vomiting or lightheadedness.     In the ED: SBP > 180, pain radiating to between the scapula, pt SOB. trops were (-) x1 and ekg was nsr. When seen by medical team, SBP between both arms were 20mmhg on multiple repeats (160/90 on LUE, 180/100 on RUE). CT chest w/ cont was negative for dissection. During the admission patient's troponin was trending up. Patient was found to be in hypertensive urgency. Patient's bp was controlled with medications. Patient was diagnosed with NSTEMI. Patient had Aspirin, plavix, statin.  Patient was taken to cath lab and is s/p successful stenting of distal LIMA graft.     Patient is medically stable to be discharged home. Patient to follow up with the cardiologist and his PCP in 1-2 weeks.

## 2022-07-26 NOTE — DISCHARGE NOTE PROVIDER - PROVIDER TOKENS
PROVIDER:[TOKEN:[[id number]],FOLLOWUP:[1 week]],PROVIDER:[TOKEN:[[id number]],FOLLOWUP:[2 weeks],ESTABLISHEDPATIENT:[T]]

## 2022-07-26 NOTE — DISCHARGE NOTE PROVIDER - NSDCMRMEDTOKEN_GEN_ALL_CORE_FT
aspirin: 81 milligram(s) orally once a day  clopidogrel 75 mg oral tablet: 1 tab(s) orally once a day  fenofibrate 145 mg oral tablet: 1 tab(s) orally once a day  Lexapro: 20 milligram(s) orally once a day  lisinopril 5 mg oral tablet: 1 tab(s) orally once a day  metoprolol: 25 milligram(s) orally 2 times a day  pantoprazole 40 mg oral delayed release tablet: 1 tab(s) orally once a day  rosuvastatin 40 mg oral tablet: 1 tab(s) orally once a day

## 2022-07-26 NOTE — DISCHARGE NOTE PROVIDER - OTHER REASON DETAILS
Patient explained the benefits of Cardiac Rehab. Patient to make an appointment with Cardiac Rehab in 2 weeks.

## 2022-07-26 NOTE — DISCHARGE NOTE PROVIDER - NSDCCPTREATMENT_GEN_ALL_CORE_FT
PRINCIPAL PROCEDURE  Procedure: Left heart cardiac cath  Findings and Treatment: FINDINGS:   Coronary Dominance: right  LM: normal  LAD:   Mid LAD: 100% , distal vessel supplied by patent LIMA  Distal LAD 80% hazy stenosis s/p pci with one MAEVE to 0%  Patent stents in D1  CX: Cx mild diffuse disease  OM1 100% occluded supplied by patent SVG  Patent stents in LIMA and SVG to OM1  RCA: 100% chronic total occlusion, distal vessel supplied by patent SVG  LIMA: as above   SVG: as above      LVEDP: 23 mmHg      ESTIMATED BLOOD LOSS: < 10 mL      CONDITION:   [x] Good   [] Fair   [] Critical      SPECIMEN REMOVED: N/A   POST-OP DIAGNOSIS:    [] Normal Coronary Angiogram   [] Mild Coronary Artery Disease (< 50% stenosis)   [x] _3_ Vessel Coronary Artery Disease (Syntax Score 30)

## 2022-07-26 NOTE — DISCHARGE NOTE NURSING/CASE MANAGEMENT/SOCIAL WORK - PATIENT PORTAL LINK FT
You can access the FollowMyHealth Patient Portal offered by [facility] by registering at the following website: [email or web address]. By joining [facility]’s FollowMyHealth portal, you will also be able to view your health information using other applications (apps) compatible with our system.

## 2022-07-26 NOTE — DISCHARGE NOTE NURSING/CASE MANAGEMENT/SOCIAL WORK - NSDCPEFALRISK_GEN_ALL_CORE
For information on Fall & Injury Prevention, visit: [email or web address] OR  [email or web address] OR  https://www.cdc.gov/steadi/patient.html

## 2022-07-26 NOTE — DISCHARGE NOTE PROVIDER - CARE PROVIDER_API CALL
Rocael Sweeney)  Cardiovascular Disease; Internal Medicine  [address] [place] [address]  Phone: [phone]  Fax: [phone]  Follow Up Time: 1 week    Ayaz Ruiz)  Cardiovascular Disease; Interventional Cardiology  [address] [place] [address]  Phone: [phone]  Fax: [phone]  Established Patient  Follow Up Time: 2 weeks

## 2022-07-26 NOTE — DISCHARGE NOTE PROVIDER - NSDCCPCAREPLAN_GEN_ALL_CORE_FT
PRINCIPAL DISCHARGE DIAGNOSIS  Diagnosis: NSTEMI (non-ST elevation myocardial infarction)  Assessment and Plan of Treatment: A heart attack occurs when blood flow to the heart muscle is interrupted. This deprives the heart muscle of  oxygen, causing tissue damage or tissue death. Common treatments include lifestyle changes, oxygen,  medicines, and surgery. Steps to Take: Rest until your doctor says it is okay to return to work or other activities, Take all medicines as prescribed by your doctor. Beta-blockers, ACE inhibitors, and antiplatelet therapy are often recommended. Attend a cardiac rehabilitation program if recommended by your doctor.  Diet: Eat a heart-healthy diet, Limit your intake of fat, cholesterol, and sodium. Foods such as ice cream, cheese, baked goods, and red meat are not the best choices. Increase your intake of whole grains, fish, fruits, vegetables, and nuts. Consume alcohol in moderation: one to two drinks per day for men, one drink per day for women. Discuss supplements with your doctor. Your doctor may refer you to dietician to advise you on meal planning. Physical Activity: The American Heart Association recommends at least 30 minutes of exercise daily, or at least 3-4 times per week, for patients who have had a heart attack. Your doctor will let you know when you are ready to begin regular exercise.Ask your doctor when you will be able to return to work. Ask your doctor when you may resume sexual activity. Do not drive unless your doctor has given you permission to do so. Medications The following medicines may be prescribed to prevent you from having another heart attack: Aspirin, which has been shown to decrease the risk of heart attacks  o Certain painkillers, such as ibuprofen, when taken together with aspirin, may put you at high risk  for gastrointestinal bleeding and also reduce the effectiveness of aspirin. Clopidogrel or prasugrel  o Avoid omeprazole or esomeprazole if you take clopidogrel. They may make clopidogrel not  work. Ask your doctor for other drug choices. ACE inhibitors, Nitroglycerin, Beta-blockers or calcium channel blockers, Cholesterol-lowering

## 2022-07-26 NOTE — PROGRESS NOTE ADULT - ASSESSMENT
Pt is a 50 y/o male with PMH of CAD s/p PCI and CABG (follows with Dr. Ruiz), HTN, HLD and depression presenting for chest pain x 4 hrs.  Pain is midsternal radiates to bilateral scapula and worsens with exertion. Associated with mild SOB.       NSTEMI  CAD s/p CABG  HTN / DL  Depression            PLAN:    ·	S/P cath. 3VCAD. S/P PCI of distal LIMA graft. Patent stents  ·	ECHO showed EF is 55-60%. Wall motion abnormalities.  Pt is a 52 y/o male with PMH of CAD s/p PCI and CABG (follows with Dr. Ruiz), HTN, HLD and depression presenting for chest pain x 4 hrs.  Pain is midsternal radiates to bilateral scapula and worsens with exertion. Associated with mild SOB.       NSTEMI  CAD s/p CABG  HTN / DL  Depression            PLAN:    ·	S/P cath. 3VCAD. S/P PCI of distal LIMA graft. Patent stents  ·	On DAPT, Rosuvastatin 40 mg po qhs, Lisinopril and Metoprolol. Will cont  ·	ECHO showed EF is 55-60%. Wall motion abnormalities.   ·	Replete Mg  ·	D/C home    * Med rec reviewed. Plan of care d/w the pt. Time spent 37 minutes.

## 2022-07-29 DIAGNOSIS — I25.5 ISCHEMIC CARDIOMYOPATHY: ICD-10-CM

## 2022-07-29 DIAGNOSIS — I10 ESSENTIAL (PRIMARY) HYPERTENSION: ICD-10-CM

## 2022-07-29 DIAGNOSIS — I21.4 NON-ST ELEVATION (NSTEMI) MYOCARDIAL INFARCTION: ICD-10-CM

## 2022-07-29 DIAGNOSIS — Z82.49 FAMILY HISTORY OF ISCHEMIC HEART DISEASE AND OTHER DISEASES OF THE CIRCULATORY SYSTEM: ICD-10-CM

## 2022-07-29 DIAGNOSIS — I25.10 ATHEROSCLEROTIC HEART DISEASE OF NATIVE CORONARY ARTERY WITHOUT ANGINA PECTORIS: ICD-10-CM

## 2022-07-29 DIAGNOSIS — Z20.822 CONTACT WITH AND (SUSPECTED) EXPOSURE TO COVID-19: ICD-10-CM

## 2022-07-29 DIAGNOSIS — K21.9 GASTRO-ESOPHAGEAL REFLUX DISEASE WITHOUT ESOPHAGITIS: ICD-10-CM

## 2022-07-29 DIAGNOSIS — R07.9 CHEST PAIN, UNSPECIFIED: ICD-10-CM

## 2022-07-29 DIAGNOSIS — F32.A DEPRESSION, UNSPECIFIED: ICD-10-CM

## 2022-07-29 DIAGNOSIS — Z79.82 LONG TERM (CURRENT) USE OF ASPIRIN: ICD-10-CM

## 2022-07-29 DIAGNOSIS — E78.5 HYPERLIPIDEMIA, UNSPECIFIED: ICD-10-CM

## 2022-07-29 DIAGNOSIS — Z95.1 PRESENCE OF AORTOCORONARY BYPASS GRAFT: ICD-10-CM

## 2023-06-30 NOTE — PROGRESS NOTE ADULT - SUBJECTIVE AND OBJECTIVE BOX
Contact patient and inform result is normal.     MIGDALIA FITZPATRICK  51y Male    CHIEF COMPLAINT:    Patient is a 51y old  Male who presents with a chief complaint of     INTERVAL HPI/OVERNIGHT EVENTS:    Patient seen and examined.    ROS: All other systems are negative.    Vital Signs:    T(F): 97.1 (22 @ 04:30), Max: 97.1 (22 @ 00:42)  HR: 68 (22 @ 04:30) (60 - 68)  BP: 130/67 (22 @ 04:30) (122/67 - 144/78)  RR: 18 (22 @ 04:30) (18 - 18)  SpO2: 99% (22 @ 08:08) (99% - 99%)  I&O's Summary    2022 07:01  -  2022 07:00  --------------------------------------------------------  IN: 400 mL / OUT: 300 mL / NET: 100 mL      Daily Height in cm: 180.34 (2022 14:45)    Daily Weight in k.3 (2022 04:30)  CAPILLARY BLOOD GLUCOSE          PHYSICAL EXAM:    GENERAL:  NAD  SKIN: No rashes or lesions  HENT: Atraumatic. Normocephalic. PERRL. Moist membranes.  NECK: Supple, No JVD. No lymphadenopathy.  PULMONARY: CTA B/L. No wheezing. No rales  CVS: Normal S1, S2. Rate and Rhythm are regular. No murmurs.  ABDOMEN/GI: Soft, Nontender, Nondistended; BS present  EXTREMITIES: Peripheral pulses intact. No edema B/L LE.  NEUROLOGIC:  No motor or sensory deficit.  PSYCH: Alert & oriented x 3    Consultant(s) Notes Reviewed:  [x ] YES  [ ] NO  Care Discussed with Consultants/Other Providers [ x] YES  [ ] NO    EKG reviewed  Telemetry reviewed    LABS:                        14.0   5.71  )-----------( 233      ( 2022 15:36 )             40.5     -    142  |  103  |  16  ----------------------------<  98  4.1   |  28  |  0.8    Ca    9.4      2022 15:36  Mg     1.8         TPro  6.6  /  Alb  4.4  /  TBili  0.3  /  DBili  x   /  AST  26  /  ALT  55<H>  /  AlkPhos  76      PT/INR - ( 2022 17:21 )   PT: 11.00 sec;   INR: 0.96 ratio         PTT - ( 2022 17:21 )  PTT:33.6 sec  Serum Pro-Brain Natriuretic Peptide: 51 pg/mL (22 @ 15:36)    Trop 0.58, CKMB --, CK --, 22 @ 01:15  Trop 0.53, CKMB --, CK --, 22 @ 22:21  Trop 0.52, CKMB --, CK --, 22 @ 17:21  Trop 0.21, CKMB --, CK --, 22 @ 00:45  Trop 0.14, CKMB --, CK --, 22 @ 21:48  Trop <0.01, CKMB --, CK --, 22 @ 15:36        RADIOLOGY & ADDITIONAL TESTS:    < from: CT Angio Abdomen and Pelvis w/ IV Cont (22 @ 20:41) >    IMPRESSION:    No CTA evidence of aortic dissection or intramural hematoma.    < end of copied text >  < from: TTE Echo Complete w/o Contrast w/ Doppler (22 @ 08:08) >      Summary:   1. Left ventricular ejection fraction, by visual estimation, is 55 to   60%.   2. Normal global left ventricular systolic function.   3. Basal and mid inferior septum and basal anteroseptal segment are   abnormal as described above.   4. Mild tricuspid regurgitation.    < end of copied text >    Imaging or report Personally Reviewed:  [ ] YES  [ ] NO    Medications:  Standing  aspirin  chewable 81 milliGRAM(s) Oral daily  atorvastatin 80 milliGRAM(s) Oral at bedtime  clopidogrel Tablet 75 milliGRAM(s) Oral daily  escitalopram 20 milliGRAM(s) Oral daily  fenofibrate Tablet 145 milliGRAM(s) Oral daily  heparin   Injectable 6500 Unit(s) SubCutaneous every 8 hours  lisinopril 5 milliGRAM(s) Oral daily  metoprolol tartrate 25 milliGRAM(s) Oral two times a day  pantoprazole    Tablet 40 milliGRAM(s) Oral before breakfast  sodium chloride 0.9%. 1000 milliLiter(s) IV Continuous <Continuous>    PRN Meds  acetaminophen     Tablet .. 650 milliGRAM(s) Oral every 6 hours PRN  labetalol Injectable 10 milliGRAM(s) IV Push once PRN  melatonin 3 milliGRAM(s) Oral at bedtime PRN  morphine  - Injectable 2 milliGRAM(s) IV Push every 6 hours PRN      Case discussed with resident    Care discussed with pt/family           MIGDALIA FITZPATRICK  51y Male    CHIEF COMPLAINT:    Patient is a 51y old  Male who presents with a chief complaint of     INTERVAL HPI/OVERNIGHT EVENTS:    Patient seen and examined. S/P cath and PCI. Feels good. No cp. No sob.    ROS: All other systems are negative.    Vital Signs:    T(F): 97.1 (22 @ 04:30), Max: 97.1 (22 @ 00:42)  HR: 68 (22 @ 04:30) (60 - 68)  BP: 130/67 (22 @ 04:30) (122/67 - 144/78)  RR: 18 (22 @ 04:30) (18 - 18)  SpO2: 99% (22 @ 08:08) (99% - 99%)  I&O's Summary    2022 07:01  -  2022 07:00  --------------------------------------------------------  IN: 400 mL / OUT: 300 mL / NET: 100 mL      Daily Height in cm: 180.34 (2022 14:45)    Daily Weight in k.3 (2022 04:30)  CAPILLARY BLOOD GLUCOSE          PHYSICAL EXAM:    GENERAL:  NAD  SKIN: No rashes or lesions  HENT: Atraumatic. Normocephalic. PERRL. Moist membranes.  NECK: Supple, No JVD. No lymphadenopathy.  PULMONARY: CTA B/L. No wheezing. No rales  CVS: Normal S1, S2. Rate and Rhythm are regular. No murmurs.  ABDOMEN/GI: Soft, Nontender, Nondistended; BS present  EXTREMITIES: Peripheral pulses intact. No edema B/L LE.  NEUROLOGIC:  No motor or sensory deficit.  PSYCH: Alert & oriented x 3    Consultant(s) Notes Reviewed:  [x ] YES  [ ] NO  Care Discussed with Consultants/Other Providers [ x] YES  [ ] NO    EKG reviewed  Telemetry reviewed    LABS:                        14.0   5.71  )-----------( 233      ( 2022 15:36 )             40.5     07-    142  |  103  |  16  ----------------------------<  98  4.1   |  28  |  0.8    Ca    9.4      2022 15:36  Mg     1.8         TPro  6.6  /  Alb  4.4  /  TBili  0.3  /  DBili  x   /  AST  26  /  ALT  55<H>  /  AlkPhos  76      PT/INR - ( 2022 17:21 )   PT: 11.00 sec;   INR: 0.96 ratio         PTT - ( 2022 17:21 )  PTT:33.6 sec  Serum Pro-Brain Natriuretic Peptide: 51 pg/mL (22 @ 15:36)    Trop 0.58, CKMB --, CK --, 22 @ 01:15  Trop 0.53, CKMB --, CK --, 22 @ 22:21  Trop 0.52, CKMB --, CK --, 22 @ 17:21  Trop 0.21, CKMB --, CK --, 22 @ 00:45  Trop 0.14, CKMB --, CK --, 22 @ 21:48  Trop <0.01, CKMB --, CK --, 22 @ 15:36        RADIOLOGY & ADDITIONAL TESTS:    < from: CT Angio Abdomen and Pelvis w/ IV Cont (22 @ 20:41) >    IMPRESSION:    No CTA evidence of aortic dissection or intramural hematoma.    < end of copied text >  < from: TTE Echo Complete w/o Contrast w/ Doppler (22 @ 08:08) >      Summary:   1. Left ventricular ejection fraction, by visual estimation, is 55 to   60%.   2. Normal global left ventricular systolic function.   3. Basal and mid inferior septum and basal anteroseptal segment are   abnormal as described above.   4. Mild tricuspid regurgitation.    < end of copied text >    Imaging or report Personally Reviewed:  [ ] YES  [ ] NO    Medications:  Standing  aspirin  chewable 81 milliGRAM(s) Oral daily  atorvastatin 80 milliGRAM(s) Oral at bedtime  clopidogrel Tablet 75 milliGRAM(s) Oral daily  escitalopram 20 milliGRAM(s) Oral daily  fenofibrate Tablet 145 milliGRAM(s) Oral daily  heparin   Injectable 6500 Unit(s) SubCutaneous every 8 hours  lisinopril 5 milliGRAM(s) Oral daily  metoprolol tartrate 25 milliGRAM(s) Oral two times a day  pantoprazole    Tablet 40 milliGRAM(s) Oral before breakfast  sodium chloride 0.9%. 1000 milliLiter(s) IV Continuous <Continuous>    PRN Meds  acetaminophen     Tablet .. 650 milliGRAM(s) Oral every 6 hours PRN  labetalol Injectable 10 milliGRAM(s) IV Push once PRN  melatonin 3 milliGRAM(s) Oral at bedtime PRN  morphine  - Injectable 2 milliGRAM(s) IV Push every 6 hours PRN      Case discussed with resident    Care discussed with pt/family

## 2023-11-14 NOTE — PATIENT PROFILE ADULT - NSPROMEDSADMININFO_GEN_A_NUR
Spray Paint Text: The liquid nitrogen was applied to the skin utilizing a spray paint frosting technique.
Show Spray Paint Technique Variable?: Yes
Spray Paint Technique: No
Billing Information: Bill by Static Price
Consent: The patient's consent was obtained including but not limited to risks of crusting, scabbing, blistering, scarring, darker or lighter pigmentary change, recurrence, incomplete removal and infection. The patient understands that the procedure is cosmetic in nature and is not covered by insurance.
Detail Level: Detailed
Price (Use Numbers Only, No Special Characters Or $): 60.00
no concerns
Post-Care Instructions: I reviewed with the patient in detail post-care instructions. Patient is to wear sunprotection, and avoid picking at any of the treated lesions. Pt may apply Vaseline to crusted or scabbing areas.

## 2023-12-05 PROBLEM — Z00.00 ENCOUNTER FOR PREVENTIVE HEALTH EXAMINATION: Status: ACTIVE | Noted: 2023-12-05

## 2023-12-07 ENCOUNTER — APPOINTMENT (OUTPATIENT)
Dept: ORTHOPEDIC SURGERY | Facility: CLINIC | Age: 53
End: 2023-12-07
Payer: COMMERCIAL

## 2023-12-07 VITALS — HEIGHT: 70 IN | WEIGHT: 220 LBS | BODY MASS INDEX: 31.5 KG/M2

## 2023-12-07 DIAGNOSIS — M25.571 PAIN IN RIGHT ANKLE AND JOINTS OF RIGHT FOOT: ICD-10-CM

## 2023-12-07 DIAGNOSIS — M16.6 OTHER BILATERAL SECONDARY OSTEOARTHRITIS OF HIP: ICD-10-CM

## 2023-12-07 DIAGNOSIS — M16.0 BILATERAL PRIMARY OSTEOARTHRITIS OF HIP: ICD-10-CM

## 2023-12-07 DIAGNOSIS — M17.11 UNILATERAL PRIMARY OSTEOARTHRITIS, RIGHT KNEE: ICD-10-CM

## 2023-12-07 PROCEDURE — 99203 OFFICE O/P NEW LOW 30 MIN: CPT

## 2023-12-07 PROCEDURE — 73562 X-RAY EXAM OF KNEE 3: CPT | Mod: RT

## 2023-12-07 PROCEDURE — 73610 X-RAY EXAM OF ANKLE: CPT | Mod: RT

## 2023-12-07 PROCEDURE — 73503 X-RAY EXAM HIP UNI 4/> VIEWS: CPT | Mod: RT

## 2023-12-07 RX ORDER — ROSUVASTATIN CALCIUM 5 MG/1
TABLET, FILM COATED ORAL
Refills: 0 | Status: ACTIVE | COMMUNITY

## 2023-12-07 RX ORDER — ASPIRIN 81 MG
TABLET,CHEWABLE ORAL
Refills: 0 | Status: ACTIVE | COMMUNITY

## 2023-12-07 RX ORDER — ESCITALOPRAM OXALATE 5 MG/1
TABLET, FILM COATED ORAL
Refills: 0 | Status: ACTIVE | COMMUNITY

## 2023-12-07 RX ORDER — LISINOPRIL 30 MG/1
TABLET ORAL
Refills: 0 | Status: ACTIVE | COMMUNITY

## 2023-12-07 RX ORDER — FENOFIBRATE 120 MG/1
TABLET ORAL
Refills: 0 | Status: ACTIVE | COMMUNITY

## 2023-12-07 RX ORDER — BUPROPION HYDROCHLORIDE 75 MG/1
TABLET, FILM COATED ORAL
Refills: 0 | Status: ACTIVE | COMMUNITY

## 2024-01-25 ENCOUNTER — APPOINTMENT (OUTPATIENT)
Dept: ORTHOPEDIC SURGERY | Facility: CLINIC | Age: 54
End: 2024-01-25

## 2024-09-20 NOTE — PATIENT PROFILE ADULT - PRO INTERPRETER NEED 2
What Type Of Note Output Would You Prefer (Optional)?: Standard Output How Severe Is Your Rash?: moderate Is This A New Presentation, Or A Follow-Up?: Rash English

## 2024-10-11 ENCOUNTER — OUTPATIENT (OUTPATIENT)
Dept: OUTPATIENT SERVICES | Facility: HOSPITAL | Age: 54
LOS: 1 days | End: 2024-10-11
Payer: COMMERCIAL

## 2024-10-11 DIAGNOSIS — Z98.890 OTHER SPECIFIED POSTPROCEDURAL STATES: Chronic | ICD-10-CM

## 2024-10-11 DIAGNOSIS — Z00.8 ENCOUNTER FOR OTHER GENERAL EXAMINATION: ICD-10-CM

## 2024-10-11 DIAGNOSIS — R06.02 SHORTNESS OF BREATH: ICD-10-CM

## 2024-10-11 DIAGNOSIS — R07.9 CHEST PAIN, UNSPECIFIED: ICD-10-CM

## 2024-10-11 DIAGNOSIS — Z95.1 PRESENCE OF AORTOCORONARY BYPASS GRAFT: Chronic | ICD-10-CM

## 2024-10-11 PROCEDURE — 75574 CT ANGIO HRT W/3D IMAGE: CPT

## 2024-10-11 PROCEDURE — 75574 CT ANGIO HRT W/3D IMAGE: CPT | Mod: 26

## 2024-10-12 DIAGNOSIS — R06.02 SHORTNESS OF BREATH: ICD-10-CM

## 2024-10-12 DIAGNOSIS — R07.9 CHEST PAIN, UNSPECIFIED: ICD-10-CM

## 2024-11-09 VITALS
WEIGHT: 195.11 LBS | DIASTOLIC BLOOD PRESSURE: 69 MMHG | HEIGHT: 70 IN | SYSTOLIC BLOOD PRESSURE: 100 MMHG | HEART RATE: 68 BPM | OXYGEN SATURATION: 97 % | RESPIRATION RATE: 16 BRPM

## 2024-11-09 NOTE — H&P CARDIOLOGY - HISTORY OF PRESENT ILLNESS
Patient is a 54y Male PMH of CAD/PCI distal anastamosis of LIMA 3/2018, PCI anastamosis of EDUIN-OM1 2/2019 and LAD graft stent 7/2022 , CABG x 4 ( 5/2015), HLD reports ACKERMAN.............................  Pt had CCTA on 10/11/24 revealing RPLV moderate stenosis, [facility] recommended to evaluate grafts and stent patency    ACC: [id number] EXAM:  CT ANGIO HEART CORONARY IC   ORDERED BY: SHAYY LOPEZ     PROCEDURE DATE:  10/11/2024      IMPRESSION:    1. Partially visualized LIMA-mid LAD bypass graft (origin and proximal   aspect not visualized, cannot exclude graft stenosis). LIMA stent in the   distal aspect/distal anastomosis. Cannot exclude severe ISR due to thick   stent struts. Distal LAD has a stent, cannot exclude severe ISR due to   small stent caliber.  2. Probable EDUIN-OM artery bypass graft, partially visualized cannot   exclude graft stenosis. EDUIN stent visualized in the distal   aspect/anastomosis. Cannot exclude severe ISR due to small stent caliber.  3. Patent SVG-RPDA graft.  4. Severe native three vessel coronary artery disease as above. Within   the unrevascularized segments, the RPLV has moderate stenosis (  60%).  5. CAD-RADS N/G/S/P4. Consider invasive angiography for further   evaluation of graft and stent patency.      Pre cath note:  indication:  [ ] STEMI                [ ] NSTEMI                 [ ] Acute coronary syndrome                   [ ]Unstable Angina   [ ] high risk  [ ] intermediate risk  [ ] low risk                   [x ] Stable Angina     non-invasive testing:      CCTA                    Date:     10/11/24                result: [ ] high risk  [x ] intermediate risk  [ ] low risk    Anti- Anginal medications:                    [ ] not used d/t                     [ ] used   ( ) BB     ( ) CCB      ( ) Nitrate   (  ) Ranexa          [ ] not used but strong indication not to use    Ejection Fraction                   [ ] <29            [ ] 30-39%   [ ] 40-49%     [ ]>50%    CHF          [ ] active (within last 14 days on meds   [ ] Chronic (on meds but no exacerbation)  NYHA Functional Class:  (  ) Class I (no limitations)  (  ) Class II (slight limitation)  (  ) Class III (marked limitation)  (  ) Class IV (symptoms at rest)    COPD                   [ ] mild (on chronic bronchodilators)  [ ] moderate (on chronic steroid therapy)      [ ] severe (indication for home O2 or PACO2 >50)    Other risk factors:                     [ ] Previous MI                     [ ] CVA/ stroke                    [ ] carotid stent/ CEA                    [ ] PVD/PAD- (arterial aneurysm, non-palpable pulses, tortuous vessel with inability to insert catheter, infra-renal dissection, renal or subclavian artery stenosis)                    [x ] previous CABG                    [ ] Renal Failure:  on HD  (  ) yes  (  ) no                    [ ] Diabetic  (  ) Type 1  (  ) Type 2                                         (  ) Insulin dependent  (  ) non-insulin dependent                                         (  ) Metformin  (  ) Januvia  (  ) Glimepiride  (  ) Glipizide  (  ) Glyburide  (  ) Actos                                         (  ) GLP-1 receptor agonists (Ozempic, Mounjaro, Wegovy, Zepbound, Trulicity, Byetta, Victoza)                                         (  ) SGLT2 Inhibitors (Farxiga, Jardiance, Invokana)                                         (  ) Other    Bleeding Risk: 0.6%    Pre-cath Hydration: (  )  cc IV bolus x 1 over 1 hr followed by:    (  ) NS @ 75cc/hr until procedure (up to 2 hrs) if EF> 50%                                                                                                                             (  ) NS @ 50cc/hr until procedure (up to 2 hrs) if EF< 50%                                        (  ) No precath hydration d/t      RIGHT RADIAL ARTERY EVALUATION:  MOIZ TEST: [] Negative          [] Positive    EF:   Date:    EKG: SR  Date: 10/21/24 Patient is a 54y Male PMH of CAD/PCI distal anastamosis of LIMA 3/2018, PCI anastamosis of EDUIN-OM1 2/2019 and LAD graft stent 7/2022, CABG x 4 ( 5/2015), HLD reports ACKERMAN.  Pt had CCTA on 10/11/24 revealing RPLV moderate stenosis, [facility] recommended to evaluate grafts and stent patency    ACC: [id number] EXAM:  CT ANGIO HEART CORONARY IC   ORDERED BY: SHAYY LOPEZ     PROCEDURE DATE:  10/11/2024      IMPRESSION:    1. Partially visualized LIMA-mid LAD bypass graft (origin and proximal   aspect not visualized, cannot exclude graft stenosis). LIMA stent in the   distal aspect/distal anastomosis. Cannot exclude severe ISR due to thick   stent struts. Distal LAD has a stent, cannot exclude severe ISR due to   small stent caliber.  2. Probable EDUIN-OM artery bypass graft, partially visualized cannot   exclude graft stenosis. EDUIN stent visualized in the distal   aspect/anastomosis. Cannot exclude severe ISR due to small stent caliber.  3. Patent SVG-RPDA graft.  4. Severe native three vessel coronary artery disease as above. Within   the unrevascularized segments, the RPLV has moderate stenosis (  60%).  5. CAD-RADS N/G/S/P4. Consider invasive angiography for further   evaluation of graft and stent patency.      Pre cath note:  indication:  [ ] STEMI                [ ] NSTEMI                 [ ] Acute coronary syndrome                   [ ]Unstable Angina   [ ] high risk  [ ] intermediate risk  [ ] low risk                   [x ] Stable Angina     non-invasive testing:      CCTA                    Date:     10/11/24                result: [ ] high risk  [x ] intermediate risk  [ ] low risk    Anti- Anginal medications:                    [ ] not used d/t                     [ ] used   (x  ) BB     ( ) CCB      ( ) Nitrate   ( x ) Ranexa          [ ] not used but strong indication not to use    Ejection Fraction                   [ ] <29            [ ] 30-39%   [ ] 40-49%     [x  ]>50%    CHF          [ ] active (within last 14 days on meds   [ ] Chronic (on meds but no exacerbation)  NYHA Functional Class:  (  ) Class I (no limitations)  (  ) Class II (slight limitation)  (  ) Class III (marked limitation)  (  ) Class IV (symptoms at rest)    COPD                   [ ] mild (on chronic bronchodilators)  [ ] moderate (on chronic steroid therapy)      [ ] severe (indication for home O2 or PACO2 >50)    Other risk factors:                     [ ] Previous MI                     [ ] CVA/ stroke                    [ ] carotid stent/ CEA                    [ ] PVD/PAD- (arterial aneurysm, non-palpable pulses, tortuous vessel with inability to insert catheter, infra-renal dissection, renal or subclavian artery stenosis)                    [x ] previous CABG                    [ ] Renal Failure:  on HD  (  ) yes  (  ) no                    [ ] Diabetic  (  ) Type 1  (  ) Type 2                                         (  ) Insulin dependent  (  ) non-insulin dependent                                         (  ) Metformin  (  ) Januvia  (  ) Glimepiride  (  ) Glipizide  (  ) Glyburide  (  ) Actos                                         (x  ) GLP-1 receptor agonists (Ozempic, Mounjaro, Wegovy, Zepbound, Trulicity, Byetta, Victoza)                                         (  ) SGLT2 Inhibitors (Farxiga, Jardiance, Invokana)                                         (  ) Other    Bleeding Risk: 0.6%    Pre-cath Hydration: ( x )  cc IV bolus x 1 over 1 hr followed by:    (x  ) NS @ 75cc/hr until procedure (up to 2 hrs) if EF> 50%                                                                                                                             (  ) NS @ 50cc/hr until procedure (up to 2 hrs) if EF< 50%                                        (  ) No precath hydration d/t      RIGHT RADIAL ARTERY EVALUATION:  MOIZ TEST: [] Negative          [x] Positive    EF:   Date:    EKG: SR  Date: 10/21/24

## 2024-11-09 NOTE — H&P CARDIOLOGY - SURGICAL SITE INCISION
Dr Angelene Nyhan updated, report to [facility]. Pt ambulated to [facility] with a steady gait.      Fazal Castro RN  09/15/18 [id number] no

## 2024-11-12 ENCOUNTER — TRANSCRIPTION ENCOUNTER (OUTPATIENT)
Age: 54
End: 2024-11-12

## 2024-11-12 ENCOUNTER — OUTPATIENT (OUTPATIENT)
Dept: OUTPATIENT SERVICES | Facility: HOSPITAL | Age: 54
LOS: 1 days | Discharge: HOME | End: 2024-11-12
Payer: COMMERCIAL

## 2024-11-12 VITALS
DIASTOLIC BLOOD PRESSURE: 60 MMHG | OXYGEN SATURATION: 96 % | HEART RATE: 65 BPM | SYSTOLIC BLOOD PRESSURE: 100 MMHG | RESPIRATION RATE: 18 BRPM

## 2024-11-12 DIAGNOSIS — Z95.1 PRESENCE OF AORTOCORONARY BYPASS GRAFT: Chronic | ICD-10-CM

## 2024-11-12 DIAGNOSIS — I25.10 ATHEROSCLEROTIC HEART DISEASE OF NATIVE CORONARY ARTERY WITHOUT ANGINA PECTORIS: ICD-10-CM

## 2024-11-12 DIAGNOSIS — E78.5 HYPERLIPIDEMIA, UNSPECIFIED: ICD-10-CM

## 2024-11-12 DIAGNOSIS — I25.118 ATHEROSCLEROTIC HEART DISEASE OF NATIVE CORONARY ARTERY WITH OTHER FORMS OF ANGINA PECTORIS: ICD-10-CM

## 2024-11-12 DIAGNOSIS — Z98.890 OTHER SPECIFIED POSTPROCEDURAL STATES: Chronic | ICD-10-CM

## 2024-11-12 DIAGNOSIS — I10 ESSENTIAL (PRIMARY) HYPERTENSION: ICD-10-CM

## 2024-11-12 LAB
ANION GAP SERPL CALC-SCNC: 7 MMOL/L — SIGNIFICANT CHANGE UP (ref 7–14)
BUN SERPL-MCNC: 11 MG/DL — SIGNIFICANT CHANGE UP (ref 10–20)
CALCIUM SERPL-MCNC: 9.5 MG/DL — SIGNIFICANT CHANGE UP (ref 8.4–10.5)
CHLORIDE SERPL-SCNC: 108 MMOL/L — SIGNIFICANT CHANGE UP (ref 98–110)
CO2 SERPL-SCNC: 27 MMOL/L — SIGNIFICANT CHANGE UP (ref 17–32)
CREAT SERPL-MCNC: 1 MG/DL — SIGNIFICANT CHANGE UP (ref 0.7–1.5)
EGFR: 88 ML/MIN/1.73M2 — SIGNIFICANT CHANGE UP
GLUCOSE SERPL-MCNC: 89 MG/DL — SIGNIFICANT CHANGE UP (ref 70–99)
HCT VFR BLD CALC: 41.1 % — LOW (ref 42–52)
HGB BLD-MCNC: 13.9 G/DL — LOW (ref 14–18)
MCHC RBC-ENTMCNC: 31.4 PG — HIGH (ref 27–31)
MCHC RBC-ENTMCNC: 33.8 G/DL — SIGNIFICANT CHANGE UP (ref 32–37)
MCV RBC AUTO: 93 FL — SIGNIFICANT CHANGE UP (ref 80–94)
NRBC # BLD: 0 /100 WBCS — SIGNIFICANT CHANGE UP (ref 0–0)
PLATELET # BLD AUTO: 228 K/UL — SIGNIFICANT CHANGE UP (ref 130–400)
PMV BLD: 11.3 FL — HIGH (ref 7.4–10.4)
POTASSIUM SERPL-MCNC: 4.8 MMOL/L — SIGNIFICANT CHANGE UP (ref 3.5–5)
POTASSIUM SERPL-SCNC: 4.8 MMOL/L — SIGNIFICANT CHANGE UP (ref 3.5–5)
RBC # BLD: 4.42 M/UL — LOW (ref 4.7–6.1)
RBC # FLD: 11.9 % — SIGNIFICANT CHANGE UP (ref 11.5–14.5)
SODIUM SERPL-SCNC: 142 MMOL/L — SIGNIFICANT CHANGE UP (ref 135–146)
WBC # BLD: 5.17 K/UL — SIGNIFICANT CHANGE UP (ref 4.8–10.8)
WBC # FLD AUTO: 5.17 K/UL — SIGNIFICANT CHANGE UP (ref 4.8–10.8)

## 2024-11-12 PROCEDURE — C1894: CPT

## 2024-11-12 PROCEDURE — 85027 COMPLETE CBC AUTOMATED: CPT

## 2024-11-12 PROCEDURE — C1887: CPT

## 2024-11-12 PROCEDURE — 93454 CORONARY ARTERY ANGIO S&I: CPT

## 2024-11-12 PROCEDURE — 36415 COLL VENOUS BLD VENIPUNCTURE: CPT

## 2024-11-12 PROCEDURE — C1769: CPT

## 2024-11-12 PROCEDURE — 80048 BASIC METABOLIC PNL TOTAL CA: CPT

## 2024-11-12 RX ORDER — TIRZEPATIDE 5 MG/.5ML
5 INJECTION, SOLUTION SUBCUTANEOUS
Refills: 0 | DISCHARGE

## 2024-11-12 RX ORDER — RANOLAZINE 500 MG/1
500 TABLET, FILM COATED, EXTENDED RELEASE ORAL ONCE
Refills: 0 | Status: DISCONTINUED | OUTPATIENT
Start: 2024-11-12 | End: 2024-11-12

## 2024-11-12 RX ORDER — SODIUM CHLORIDE 9 MG/ML
150 INJECTION, SOLUTION INTRAMUSCULAR; INTRAVENOUS; SUBCUTANEOUS
Refills: 0 | Status: DISCONTINUED | OUTPATIENT
Start: 2024-11-12 | End: 2024-11-12

## 2024-11-12 RX ORDER — SODIUM CHLORIDE 9 MG/ML
1000 INJECTION, SOLUTION INTRAMUSCULAR; INTRAVENOUS; SUBCUTANEOUS
Refills: 0 | Status: DISCONTINUED | OUTPATIENT
Start: 2024-11-12 | End: 2024-11-12

## 2024-11-12 RX ORDER — SODIUM CHLORIDE 9 MG/ML
250 INJECTION, SOLUTION INTRAMUSCULAR; INTRAVENOUS; SUBCUTANEOUS
Refills: 0 | Status: DISCONTINUED | OUTPATIENT
Start: 2024-11-12 | End: 2024-11-12

## 2024-11-12 RX ORDER — CHLORHEXIDINE GLUCONATE 40 MG/ML
1 SOLUTION TOPICAL ONCE
Refills: 0 | Status: DISCONTINUED | OUTPATIENT
Start: 2024-11-12 | End: 2024-11-12

## 2024-11-12 RX ADMIN — RANOLAZINE 500 MILLIGRAM(S): 500 TABLET, FILM COATED, EXTENDED RELEASE ORAL at 11:00

## 2024-11-12 RX ADMIN — SODIUM CHLORIDE 250 MILLILITER(S): 9 INJECTION, SOLUTION INTRAMUSCULAR; INTRAVENOUS; SUBCUTANEOUS at 11:01

## 2024-11-12 NOTE — CHART NOTE - NSCHARTNOTEFT_GEN_A_CORE
PROCEDURE:   - Left heart cath       PHYSICIAN: Dr. Ruiz  FELLOW: Dr. Bone, Dr. David Odom    Pre-procedure Diagnosis: CAD    Consent: Patient   Anesthesia: Sedation | Local     ACCESS & CLOSURE:  6 Fr L Radial Artery -> TR Band     IV Contrast: 100 mL      Intervention:   None  Implants:   None    AUC: 7     FINDINGS:     Coronary Dominance: Right    LM: no significant disease     LAD: native vessel moderately diseased, LIMA to LAD patent with patent grafts, distal LAD lesion  D1: large vessel, mild disease    CX: mild disease          RCA:   RPDA: SVG to RPDA patent            EF: 55 % by Echo      ESTIMATED BLOOD LOSS: < 10 mL      CONDITION: Good   SPECIMEN REMOVED: N/A     POST-OP DIAGNOSIS:    - CAD s/p CABG with patent grafts  - No new significant obstructive disease       PLAN OF CARE:   [X] D/C Home Today   [X] Medications:   - Continue all home medications  [X] LVEDP guided IV Fluids: NS @ 100cc/h x 2 hours  [X] Remove TR band . Hold manual pressure if signs of hematoma or bleeding over radial access site.

## 2024-11-12 NOTE — ASU DISCHARGE PLAN (ADULT/PEDIATRIC) - CARE PROVIDER_API CALL
Ayaz Ruiz  Interventional Cardiology  [address] [place] [address], [address]  [place] [address]  Phone: [phone]  Fax: [phone]  Follow Up Time: 2 weeks

## 2024-11-12 NOTE — ASU DISCHARGE PLAN (ADULT/PEDIATRIC) - NS MD DC FALL RISK RISK
For information on Fall & Injury Prevention, visit: [email or web address] OR  [email or web address] OR  https://www.cdc.gov/steadi/patient.html

## 2024-11-12 NOTE — ASU DISCHARGE PLAN (ADULT/PEDIATRIC) - ASU DC SPECIAL INSTRUCTIONSFT
Discharge Instructions as follows:  - Continue medical regimen as prescribed.  - If you are diabetic and taking medication containing Metformin, do not take them for 48 hours after the procedure   - Instructed to call 911 if chest pain, shortness of breath or bleeding from access site.  - No heavy lifting > 10lbs x 1 week.  - No driving x 24 hours.  - No baths, swimming pools x 1 week, may shower  - Low sodium low fat low cholesterol diet  - Follow-up with Cardiologist in 1-2 weeks after discharge  - Soreness or tenderness at the site is possible, it will diminish over time. You may take Tylenol every 4-6 hours as needed. Nothing stronger is needed. NO Motrin/Ibuprofen  - Any questions call cardiac cath lab [phone]

## 2024-11-12 NOTE — ASU DISCHARGE PLAN (ADULT/PEDIATRIC) - FINANCIAL ASSISTANCE
[facility] provides services at a reduced cost to those who are determined to be eligible through [facility]’s financial assistance program. Information regarding [facility]’s financial assistance program can be found by going to [email or web address] or by calling [phone].
Clear bilaterally, pupils equal, round and reactive to light.

## 2024-11-12 NOTE — PHARMACOTHERAPY INTERVENTION NOTE - COMMENTS
ranolazine 500mg po daily stat, d/w ACP Repka to change to once pre-cath, RN removed from Pyxis. ACP Repka requested I adjust frequency to once, verified to remove from que